# Patient Record
Sex: FEMALE | Race: WHITE | NOT HISPANIC OR LATINO | Employment: UNEMPLOYED | URBAN - NONMETROPOLITAN AREA
[De-identification: names, ages, dates, MRNs, and addresses within clinical notes are randomized per-mention and may not be internally consistent; named-entity substitution may affect disease eponyms.]

---

## 2020-08-26 ENCOUNTER — HOSPITAL ENCOUNTER (EMERGENCY)
Facility: HOSPITAL | Age: 35
Discharge: HOME | End: 2020-08-26
Attending: EMERGENCY MEDICINE | Admitting: EMERGENCY MEDICINE
Payer: COMMERCIAL

## 2020-08-26 ENCOUNTER — APPOINTMENT (EMERGENCY)
Dept: CT IMAGING | Facility: HOSPITAL | Age: 35
End: 2020-08-26
Payer: COMMERCIAL

## 2020-08-26 VITALS
HEART RATE: 76 BPM | DIASTOLIC BLOOD PRESSURE: 64 MMHG | OXYGEN SATURATION: 99 % | RESPIRATION RATE: 18 BRPM | TEMPERATURE: 97.2 F | WEIGHT: 233.8 LBS | SYSTOLIC BLOOD PRESSURE: 120 MMHG

## 2020-08-26 DIAGNOSIS — N13.30 HYDRONEPHROSIS, LEFT: Primary | ICD-10-CM

## 2020-08-26 DIAGNOSIS — N20.1 URETEROLITHIASIS: ICD-10-CM

## 2020-08-26 LAB
ALBUMIN SERPL BCP-MCNC: 3.7 G/DL (ref 3.5–5)
ALP SERPL-CCNC: 59 U/L (ref 46–116)
ALT SERPL W P-5'-P-CCNC: 60 U/L (ref 12–78)
ANION GAP SERPL CALCULATED.3IONS-SCNC: 8 MMOL/L (ref 4–13)
AST SERPL W P-5'-P-CCNC: 26 U/L (ref 5–45)
BACTERIA UR QL AUTO: ABNORMAL /HPF
BASOPHILS # BLD AUTO: 0.05 THOUSANDS/ΜL (ref 0–0.1)
BASOPHILS NFR BLD AUTO: 0 % (ref 0–1)
BILIRUB SERPL-MCNC: 0.35 MG/DL (ref 0.2–1)
BILIRUB UR QL STRIP: NEGATIVE
BUN SERPL-MCNC: 18 MG/DL (ref 5–25)
CALCIUM SERPL-MCNC: 8.7 MG/DL (ref 8.3–10.1)
CHLORIDE SERPL-SCNC: 104 MMOL/L (ref 100–108)
CLARITY UR: ABNORMAL
CO2 SERPL-SCNC: 26 MMOL/L (ref 21–32)
COLOR UR: ABNORMAL
CREAT SERPL-MCNC: 1.12 MG/DL (ref 0.6–1.3)
EOSINOPHIL # BLD AUTO: 0.07 THOUSAND/ΜL (ref 0–0.61)
EOSINOPHIL NFR BLD AUTO: 1 % (ref 0–6)
ERYTHROCYTE [DISTWIDTH] IN BLOOD BY AUTOMATED COUNT: 12.4 % (ref 11.6–15.1)
EXT PREG TEST URINE: NEGATIVE
EXT. CONTROL ED NAV: NORMAL
GFR SERPL CREATININE-BSD FRML MDRD: 64 ML/MIN/1.73SQ M
GLUCOSE SERPL-MCNC: 160 MG/DL (ref 65–140)
GLUCOSE UR STRIP-MCNC: NEGATIVE MG/DL
HCT VFR BLD AUTO: 43.2 % (ref 34.8–46.1)
HGB BLD-MCNC: 14.4 G/DL (ref 11.5–15.4)
HGB UR QL STRIP.AUTO: ABNORMAL
IMM GRANULOCYTES # BLD AUTO: 0.07 THOUSAND/UL (ref 0–0.2)
IMM GRANULOCYTES NFR BLD AUTO: 1 % (ref 0–2)
KETONES UR STRIP-MCNC: NEGATIVE MG/DL
LEUKOCYTE ESTERASE UR QL STRIP: ABNORMAL
LIPASE SERPL-CCNC: 150 U/L (ref 73–393)
LYMPHOCYTES # BLD AUTO: 1.44 THOUSANDS/ΜL (ref 0.6–4.47)
LYMPHOCYTES NFR BLD AUTO: 12 % (ref 14–44)
MCH RBC QN AUTO: 29.8 PG (ref 26.8–34.3)
MCHC RBC AUTO-ENTMCNC: 33.3 G/DL (ref 31.4–37.4)
MCV RBC AUTO: 89 FL (ref 82–98)
MONOCYTES # BLD AUTO: 0.62 THOUSAND/ΜL (ref 0.17–1.22)
MONOCYTES NFR BLD AUTO: 5 % (ref 4–12)
NEUTROPHILS # BLD AUTO: 10.12 THOUSANDS/ΜL (ref 1.85–7.62)
NEUTS SEG NFR BLD AUTO: 81 % (ref 43–75)
NITRITE UR QL STRIP: NEGATIVE
NON-SQ EPI CELLS URNS QL MICRO: ABNORMAL /HPF
NRBC BLD AUTO-RTO: 0 /100 WBCS
PH UR STRIP.AUTO: 7 [PH]
PLATELET # BLD AUTO: 276 THOUSANDS/UL (ref 149–390)
PMV BLD AUTO: 10.2 FL (ref 8.9–12.7)
POTASSIUM SERPL-SCNC: 4.3 MMOL/L (ref 3.5–5.3)
PROT SERPL-MCNC: 6.9 G/DL (ref 6.4–8.2)
PROT UR STRIP-MCNC: ABNORMAL MG/DL
RBC # BLD AUTO: 4.83 MILLION/UL (ref 3.81–5.12)
RBC #/AREA URNS AUTO: ABNORMAL /HPF
SODIUM SERPL-SCNC: 138 MMOL/L (ref 136–145)
SP GR UR STRIP.AUTO: 1.01 (ref 1–1.03)
UROBILINOGEN UR QL STRIP.AUTO: 0.2 E.U./DL
WBC # BLD AUTO: 12.37 THOUSAND/UL (ref 4.31–10.16)
WBC #/AREA URNS AUTO: ABNORMAL /HPF

## 2020-08-26 PROCEDURE — 36415 COLL VENOUS BLD VENIPUNCTURE: CPT | Performed by: EMERGENCY MEDICINE

## 2020-08-26 PROCEDURE — 83690 ASSAY OF LIPASE: CPT | Performed by: EMERGENCY MEDICINE

## 2020-08-26 PROCEDURE — G1004 CDSM NDSC: HCPCS

## 2020-08-26 PROCEDURE — 85025 COMPLETE CBC W/AUTO DIFF WBC: CPT | Performed by: EMERGENCY MEDICINE

## 2020-08-26 PROCEDURE — 96375 TX/PRO/DX INJ NEW DRUG ADDON: CPT

## 2020-08-26 PROCEDURE — 96361 HYDRATE IV INFUSION ADD-ON: CPT

## 2020-08-26 PROCEDURE — 80053 COMPREHEN METABOLIC PANEL: CPT | Performed by: EMERGENCY MEDICINE

## 2020-08-26 PROCEDURE — 96374 THER/PROPH/DIAG INJ IV PUSH: CPT

## 2020-08-26 PROCEDURE — 81001 URINALYSIS AUTO W/SCOPE: CPT | Performed by: EMERGENCY MEDICINE

## 2020-08-26 PROCEDURE — 81025 URINE PREGNANCY TEST: CPT | Performed by: EMERGENCY MEDICINE

## 2020-08-26 PROCEDURE — 74176 CT ABD & PELVIS W/O CONTRAST: CPT

## 2020-08-26 PROCEDURE — 99284 EMERGENCY DEPT VISIT MOD MDM: CPT | Performed by: EMERGENCY MEDICINE

## 2020-08-26 PROCEDURE — 99284 EMERGENCY DEPT VISIT MOD MDM: CPT

## 2020-08-26 RX ORDER — TAMSULOSIN HYDROCHLORIDE 0.4 MG/1
0.4 CAPSULE ORAL
Qty: 5 CAPSULE | Refills: 0 | Status: SHIPPED | OUTPATIENT
Start: 2020-08-26 | End: 2020-09-29

## 2020-08-26 RX ORDER — MELATONIN 10 MG
10 CAPSULE ORAL
COMMUNITY

## 2020-08-26 RX ORDER — ONDANSETRON 2 MG/ML
4 INJECTION INTRAMUSCULAR; INTRAVENOUS ONCE
Status: COMPLETED | OUTPATIENT
Start: 2020-08-26 | End: 2020-08-26

## 2020-08-26 RX ORDER — KETOROLAC TROMETHAMINE 30 MG/ML
15 INJECTION, SOLUTION INTRAMUSCULAR; INTRAVENOUS ONCE
Status: COMPLETED | OUTPATIENT
Start: 2020-08-26 | End: 2020-08-26

## 2020-08-26 RX ORDER — TAMSULOSIN HYDROCHLORIDE 0.4 MG/1
0.4 CAPSULE ORAL ONCE
Status: COMPLETED | OUTPATIENT
Start: 2020-08-26 | End: 2020-08-26

## 2020-08-26 RX ORDER — OXYCODONE HYDROCHLORIDE AND ACETAMINOPHEN 5; 325 MG/1; MG/1
1 TABLET ORAL EVERY 4 HOURS PRN
Qty: 15 TABLET | Refills: 0 | Status: ON HOLD | OUTPATIENT
Start: 2020-08-26 | End: 2020-09-30 | Stop reason: SDUPTHER

## 2020-08-26 RX ORDER — ONDANSETRON 4 MG/1
4 TABLET, FILM COATED ORAL EVERY 6 HOURS
Qty: 12 TABLET | Refills: 0 | Status: SHIPPED | OUTPATIENT
Start: 2020-08-26 | End: 2021-05-03

## 2020-08-26 RX ORDER — CEPHALEXIN 250 MG/1
500 CAPSULE ORAL ONCE
Status: COMPLETED | OUTPATIENT
Start: 2020-08-26 | End: 2020-08-26

## 2020-08-26 RX ORDER — CEPHALEXIN 500 MG/1
500 CAPSULE ORAL 4 TIMES DAILY
Qty: 20 CAPSULE | Refills: 0 | Status: SHIPPED | OUTPATIENT
Start: 2020-08-26 | End: 2020-08-31

## 2020-08-26 RX ADMIN — TAMSULOSIN HYDROCHLORIDE 0.4 MG: 0.4 CAPSULE ORAL at 08:15

## 2020-08-26 RX ADMIN — SODIUM CHLORIDE 1000 ML: 0.9 INJECTION, SOLUTION INTRAVENOUS at 06:29

## 2020-08-26 RX ADMIN — CEPHALEXIN 500 MG: 250 CAPSULE ORAL at 08:15

## 2020-08-26 RX ADMIN — KETOROLAC TROMETHAMINE 15 MG: 30 INJECTION, SOLUTION INTRAMUSCULAR at 06:31

## 2020-08-26 RX ADMIN — ONDANSETRON 4 MG: 2 INJECTION INTRAMUSCULAR; INTRAVENOUS at 06:32

## 2020-08-26 NOTE — ED PROVIDER NOTES
History  Chief Complaint   Patient presents with    Flank Pain     Patient started with left lower flank pain that radiates around left side  Patient has some nausea and vomiting  Patient is a 26-year-old female presenting to the emergency department complaining of left flank pain that has kept her awake since 2:00 a m  In the morning, she has had associated nausea and vomiting, denies any fever, no diarrhea, no dysuria or hematuria, no history of similar symptoms previously          Prior to Admission Medications   Prescriptions Last Dose Informant Patient Reported? Taking? Melatonin 10 MG CAPS   Yes Yes   Sig: Take 10 mg by mouth      Facility-Administered Medications: None       Past Medical History:   Diagnosis Date    Anal fistula        Past Surgical History:   Procedure Laterality Date    TREATMENT FISTULA ANAL         History reviewed  No pertinent family history  I have reviewed and agree with the history as documented  E-Cigarette/Vaping    E-Cigarette Use Never User      E-Cigarette/Vaping Substances     Social History     Tobacco Use    Smoking status: Never Smoker    Smokeless tobacco: Never Used   Substance Use Topics    Alcohol use: Not Currently    Drug use: Never       Review of Systems   Constitutional: Negative  HENT: Negative  Eyes: Negative  Respiratory: Negative  Cardiovascular: Negative  Gastrointestinal: Positive for nausea and vomiting  Endocrine: Negative  Genitourinary: Positive for flank pain  Skin: Negative  Allergic/Immunologic: Negative  Neurological: Negative  Hematological: Negative  Psychiatric/Behavioral: Negative  Physical Exam  Physical Exam  Constitutional:       Appearance: She is well-developed  HENT:      Head: Normocephalic and atraumatic  Eyes:      Conjunctiva/sclera: Conjunctivae normal       Pupils: Pupils are equal, round, and reactive to light     Neck:      Musculoskeletal: Normal range of motion and neck supple  Cardiovascular:      Rate and Rhythm: Normal rate  Pulmonary:      Effort: Pulmonary effort is normal    Abdominal:      Palpations: Abdomen is soft  Musculoskeletal: Normal range of motion  Skin:     General: Skin is warm and dry  Neurological:      Mental Status: She is alert and oriented to person, place, and time           Vital Signs  ED Triage Vitals [08/26/20 0622]   Temperature Pulse Respirations Blood Pressure SpO2   (!) 97 2 °F (36 2 °C) 92 18 123/59 99 %      Temp Source Heart Rate Source Patient Position - Orthostatic VS BP Location FiO2 (%)   Temporal Monitor Sitting Right arm --      Pain Score       7           Vitals:    08/26/20 0622 08/26/20 0824   BP: 123/59 120/64   Pulse: 92 76   Patient Position - Orthostatic VS: Sitting                ED Medications  Medications   sodium chloride 0 9 % bolus 1,000 mL (0 mL Intravenous Stopped 8/26/20 0729)   ketorolac (TORADOL) injection 15 mg (15 mg Intravenous Given 8/26/20 0631)   ondansetron (ZOFRAN) injection 4 mg (4 mg Intravenous Given 8/26/20 1639)   tamsulosin (FLOMAX) capsule 0 4 mg (0 4 mg Oral Given 8/26/20 0815)   cephalexin (KEFLEX) capsule 500 mg (500 mg Oral Given 8/26/20 0815)       Diagnostic Studies  Results Reviewed     Procedure Component Value Units Date/Time    Urine Microscopic [[id number]]  (Abnormal) Collected:  08/26/20 0702    Lab Status:  Final result Specimen:  Urine, Clean Catch Updated:  08/26/20 0735     RBC, UA Innumerable /hpf      WBC, UA 2-4 /hpf      Epithelial Cells Moderate /hpf      Bacteria, UA Occasional /hpf     UA w Reflex to Microscopic w Reflex to Culture [[id number]]  (Abnormal) Collected:  08/26/20 0702    Lab Status:  Final result Specimen:  Urine, Clean Catch Updated:  08/26/20 0727     Color, UA Danielle     Clarity, UA Slightly Cloudy     Specific [place], UA 1 010     pH, UA 7 0     Leukocytes, UA Small     Nitrite, UA Negative     Protein, UA Trace mg/dl      Glucose, UA Negative mg/dl Ketones, UA Negative mg/dl      Urobilinogen, UA 0 2 E U /dl      Bilirubin, UA Negative     Blood, UA Large    POCT pregnancy, urine [[id number]]  (Normal) Resulted:  08/26/20 0709    Lab Status:  Final result Updated:  08/26/20 0709     EXT PREG TEST UR (Ref: Negative) negative     Control valid    Comprehensive metabolic panel [[id number]]  (Abnormal) Collected:  08/26/20 0628    Lab Status:  Final result Specimen:  Blood from Arm, Right Updated:  08/26/20 0657     Sodium 138 mmol/L      Potassium 4 3 mmol/L      Chloride 104 mmol/L      CO2 26 mmol/L      ANION GAP 8 mmol/L      BUN 18 mg/dL      Creatinine 1 12 mg/dL      Glucose 160 mg/dL      Calcium 8 7 mg/dL      AST 26 U/L      ALT 60 U/L      Alkaline Phosphatase 59 U/L      Total Protein 6 9 g/dL      Albumin 3 7 g/dL      Total Bilirubin 0 35 mg/dL      eGFR 64 ml/min/1 73sq m     Narrative:       [place] guidelines for Chronic Kidney Disease (CKD):     Stage 1 with normal or high GFR (GFR > 90 mL/min/1 73 square meters)    Stage 2 Mild CKD (GFR = 60-89 mL/min/1 73 square meters)    Stage 3A Moderate CKD (GFR = 45-59 mL/min/1 73 square meters)    Stage 3B Moderate CKD (GFR = 30-44 mL/min/1 73 square meters)    Stage 4 Severe CKD (GFR = 15-29 mL/min/1 73 square meters)    Stage 5 End Stage CKD (GFR <15 mL/min/1 73 square meters)  Note: GFR calculation is accurate only with a steady state creatinine    Lipase [[id number]]  (Normal) Collected:  08/26/20 0628    Lab Status:  Final result Specimen:  Blood from Arm, Right Updated:  08/26/20 0657     Lipase 150 u/L     CBC and differential [[id number]]  (Abnormal) Collected:  08/26/20 0628    Lab Status:  Final result Specimen:  Blood from Arm, Right Updated:  08/26/20 0635     WBC 12 37 Thousand/uL      RBC 4 83 Million/uL      Hemoglobin 14 4 g/dL      Hematocrit 43 2 %      MCV 89 fL      MCH 29 8 pg      MCHC 33 3 g/dL      RDW 12 4 %      MPV 10 2 fL      Platelets 534 Thousands/uL      nRBC 0 /100 WBCs      Neutrophils Relative 81 %      Immat GRANS % 1 %      Lymphocytes Relative 12 %      Monocytes Relative 5 %      Eosinophils Relative 1 %      Basophils Relative 0 %      Neutrophils Absolute 10 12 Thousands/µL      Immature Grans Absolute 0 07 Thousand/uL      Lymphocytes Absolute 1 44 Thousands/µL      Monocytes Absolute 0 62 Thousand/µL      Eosinophils Absolute 0 07 Thousand/µL      Basophils Absolute 0 05 Thousands/µL                  CT renal stone study abdomen pelvis without contrast   Final Result by Hung Frederick MD (08/26 0747)      There is moderate left hydronephrosis and moderate left hydroureter related to an 8 x 7 x 6 mm calculus in the mid to distal left ureter, at the level of L5  Bilateral nephrolithiasis  No right hydronephrosis  Small right renal cyst       Borderline splenomegaly  Small hiatal hernia  Workstation performed: [id number]                         ED Course       US AUDIT      Most Recent Value   Initial Alcohol Screen: US AUDIT-C    1  How often do you have a drink containing alcohol?  0 Filed at: 08/26/2020 0624   2  How many drinks containing alcohol do you have on a typical day you are drinking? 0 Filed at: 08/26/2020 0624   3b  FEMALE Any Age, or MALE 65+: How often do you have 4 or more drinks on one occassion? 0 Filed at: 08/26/2020 3740   Audit-C Score  0 Filed at: 08/26/2020 2874                  RAPHAEL/DAST-10      Most Recent Value   How many times in the past year have you    Used an illegal drug or used a prescription medication for non-medical reasons?   Never Filed at: 08/26/2020 9295                                    Disposition  Final diagnoses:   Hydronephrosis, left   Ureterolithiasis - Left 6 x 7 x 8 mm mid to distal ureteral calculus     Time reflects when diagnosis was documented in both MDM as applicable and the Disposition within this note     Time User Action Codes Description Comment 8/26/2020  7:53 AM Landry Perez Add [N13 30] Hydronephrosis, left     8/26/2020  7:53 AM Ova Ezra Add [N20 1] Ureterolithiasis     8/26/2020  7:53 AM Treasure Landry Akers Modify [N20 1] Ureterolithiasis Left 6 x 7 x 8 mm mid ureteral calculus    8/26/2020  7:54 AM Yang Perez Modify [N20 1] Ureterolithiasis Left 6 x 7 x 8 mm mid to distal ureteral calculus      ED Disposition     ED Disposition Condition Date/Time Comment    Discharge Stable Wed Aug 26, 2020  7:52 AM Maeveangelina Trini discharge to home/self care  Follow-up Information     Follow up With Specialties Details Why Rufus Pandey MD Urology Schedule an appointment as soon as possible for a visit in 2 days 6 x 8 x 7 mm kidney stone with hydronephrosis left   [address], [place] [id number] [phone]            Discharge Medication List as of 8/26/2020  7:56 AM      START taking these medications    Details   cephalexin (KEFLEX) 500 mg capsule Take 1 capsule (500 mg total) by mouth 4 (four) times a day for 5 days, Starting Wed 8/26/2020, Until Mon 8/31/2020, Normal      ondansetron (ZOFRAN) 4 mg tablet Take 1 tablet (4 mg total) by mouth every 6 (six) hours, Starting Wed 8/26/2020, Normal      oxyCODONE-acetaminophen (PERCOCET) 5-325 mg per tablet Take 1 tablet by mouth every 4 (four) hours as needed for moderate pain for up to 15 dosesMax Daily Amount: 6 tablets, Starting Wed 8/26/2020, Normal      tamsulosin (FLOMAX) 0 4 mg Take 1 capsule (0 4 mg total) by mouth daily with dinner for 5 days, Starting Wed 8/26/2020, Until Mon 8/31/2020, Normal         CONTINUE these medications which have NOT CHANGED    Details   Melatonin 10 MG CAPS Take 10 mg by mouth, Historical Med               PDMP Review     None          ED Provider  Electronically Signed by           Jose Montilla DO  08/27/20 8361

## 2020-08-26 NOTE — ED CARE HANDOFF
Emergency Department Sign Out Note        Sign out and transfer of care from Dr Quispe   See Separate Emergency Department note  The patient, Nadja Rodriguez, was evaluated by the previous provider for flank pain  Workup Completed:  Labs Reviewed   CBC AND DIFFERENTIAL - Abnormal       Result Value Ref Range Status    WBC 12 37 (*) 4 31 - 10 16 Thousand/uL Final    RBC 4 83  3 81 - 5 12 Million/uL Final    Hemoglobin 14 4  11 5 - 15 4 g/dL Final    Hematocrit 43 2  34 8 - 46 1 % Final    MCV 89  82 - 98 fL Final    MCH 29 8  26 8 - 34 3 pg Final    MCHC 33 3  31 4 - 37 4 g/dL Final    RDW 12 4  11 6 - 15 1 % Final    MPV 10 2  8 9 - 12 7 fL Final    Platelets 638  112 - 390 Thousands/uL Final    nRBC 0  /100 WBCs Final    Neutrophils Relative 81 (*) 43 - 75 % Final    Immat GRANS % 1  0 - 2 % Final    Lymphocytes Relative 12 (*) 14 - 44 % Final    Monocytes Relative 5  4 - 12 % Final    Eosinophils Relative 1  0 - 6 % Final    Basophils Relative 0  0 - 1 % Final    Neutrophils Absolute 10 12 (*) 1 85 - 7 62 Thousands/µL Final    Immature Grans Absolute 0 07  0 00 - 0 20 Thousand/uL Final    Lymphocytes Absolute 1 44  0 60 - 4 47 Thousands/µL Final    Monocytes Absolute 0 62  0 17 - 1 22 Thousand/µL Final    Eosinophils Absolute 0 07  0 00 - 0 61 Thousand/µL Final    Basophils Absolute 0 05  0 00 - 0 10 Thousands/µL Final   COMPREHENSIVE METABOLIC PANEL - Abnormal    Sodium 138  136 - 145 mmol/L Final    Potassium 4 3  3 5 - 5 3 mmol/L Final    Chloride 104  100 - 108 mmol/L Final    CO2 26  21 - 32 mmol/L Final    ANION GAP 8  4 - 13 mmol/L Final    BUN 18  5 - 25 mg/dL Final    Creatinine 1 12  0 60 - 1 30 mg/dL Final    Comment: Standardized to IDMS reference method    Glucose 160 (*) 65 - 140 mg/dL Final    Comment: If the patient is fasting, the ADA then defines impaired fasting glucose as > 100 mg/dL and diabetes as > or equal to 123 mg/dL    Specimen collection should occur prior to Sulfasalazine administration due to the potential for falsely depressed results  Specimen collection should occur prior to Sulfapyridine administration due to the potential for falsely elevated results  Calcium 8 7  8 3 - 10 1 mg/dL Final    AST 26  5 - 45 U/L Final    Comment: Specimen collection should occur prior to Sulfasalazine administration due to the potential for falsely depressed results  ALT 60  12 - 78 U/L Final    Comment: Specimen collection should occur prior to Sulfasalazine administration due to the potential for falsely depressed results  Alkaline Phosphatase 59  46 - 116 U/L Final    Total Protein 6 9  6 4 - 8 2 g/dL Final    Albumin 3 7  3 5 - 5 0 g/dL Final    Total Bilirubin 0 35  0 20 - 1 00 mg/dL Final    Comment: Use of this assay is not recommended for patients undergoing treatment with eltrombopag due to the potential for falsely elevated results      eGFR 64  ml/min/1 73sq m Final    Narrative:     National Kid[employer] guidelines for Chronic Kidney Disease (CKD):     Stage 1 with normal or high GFR (GFR > 90 mL/min/1 73 square meters)    Stage 2 Mild CKD (GFR = 60-89 mL/min/1 73 square meters)    Stage 3A Moderate CKD (GFR = 45-59 mL/min/1 73 square meters)    Stage 3B Moderate CKD (GFR = 30-44 mL/min/1 73 square meters)    Stage 4 Severe CKD (GFR = 15-29 mL/min/1 73 square meters)    Stage 5 End Stage CKD (GFR <15 mL/min/1 73 square meters)  Note: GFR calculation is accurate only with a steady state creatinine   UA W REFLEX TO MICROSCOPIC WITH REFLEX TO CULTURE - Abnormal    Color, UA Danielle   Final    Clarity, UA Slightly Cloudy   Final    Specific [place], UA 1 010  1 003 - 1 030 Final    pH, UA 7 0  4 5, 5 0, 5 5, 6 0, 6 5, 7 0, 7 5, 8 0 Final    Leukocytes, UA Small (*) Negative Final    Nitrite, UA Negative  Negative Final    Protein, UA Trace (*) Negative mg/dl Final    Glucose, UA Negative  Negative mg/dl Final    Ketones, UA Negative  Negative mg/dl Final Urobilinogen, UA 0 2  0 2, 1 0 E U /dl E U /dl Final    Bilirubin, UA Negative  Negative Final    Blood, UA Large (*) Negative Final   URINE MICROSCOPIC - Abnormal    RBC, UA Innumerable (*) None Seen, 0-5 /hpf Final    WBC, UA 2-4 (*) None Seen, 0-5, 5-55, 5-65 /hpf Final    Epithelial Cells Moderate (*) None Seen, Occasional /hpf Final    Bacteria, UA Occasional  None Seen, Occasional /hpf Final   LIPASE - Normal    Lipase 150  73 - 393 u/L Final   POCT PREGNANCY, URINE - Normal    EXT PREG TEST UR (Ref: Negative) negative   Final    Control valid   Final         ED Course / Workup Pending (followup):  Ct Renal Stone Study Abdomen Pelvis Without Contrast    Result Date: 8/26/2020  Narrative: CT ABDOMEN AND PELVIS WITHOUT IV CONTRAST - LOW DOSE RENAL STONE INDICATION:   Flank pain, kidney stone suspected  Left flank pain, nausea, vomiting  COMPARISON:  None available  TECHNIQUE:  Low dose thin section CT examination of the abdomen and pelvis was performed without intravenous or oral contrast according to a protocol specifically designed to evaluate for urinary tract calculus  Axial, sagittal, and coronal 2D reformatted images were created from the source data and submitted for interpretation  Evaluation for pathology in the abdomen and pelvis that is unrelated to urinary tract calculi is limited  Radiation dose length product (DLP) for this visit:  561 mGy-cm   This examination, like all CT scans performed in the [place], was performed utilizing techniques to minimize radiation dose exposure, including the use of iterative reconstruction and automated exposure control  FINDINGS: RIGHT KIDNEY AND URETER: There are nonobstructing intrarenal calculi measuring on the order of 3 mm and less  No hydronephrosis or hydroureter  There is a 1 5 cm cyst within the lateral aspect of the interpolar region right kidney   LEFT KIDNEY AND URETER: There is an 8 x 7 x 6 mm calculus in the mid to distal left ureter at the level of L5  The left kidney is swollen and there is moderate left hydronephrosis and moderate left hydroureter to the level of the calculus  There is also some stranding of the fat tracking adjacent to the left ureter  Additionally, there is a 3 mm nonobstructing calculus in the lower pole left kidney  URINARY BLADDER: Evaluation of the urinary bladder is limited by underdistention  The configuration of the anterior superior aspect of the urinary bladder suggest urachal remnant  No significant abnormality in the visualized lung bases  Limited low radiation dose noncontrast CT evaluation demonstrates no clinically significant abnormality of the visualized liver, pancreas, or adrenal glands  The spleen is borderline enlarged, measuring approximately 13 8 cm AP dimension  No calcified gallstones or gallbladder wall thickening noted  No ascites or bulky lymphadenopathy on this limited noncontrast study  There is a small hiatal hernia  There is no evidence of bowel obstruction  Limited evaluation demonstrates no evidence to suggest acute appendicitis  No acute fracture or destructive osseous lesion is identified  An intrauterine contraceptive device is present within the uterus  Impression: There is moderate left hydronephrosis and moderate left hydroureter related to an 8 x 7 x 6 mm calculus in the mid to distal left ureter, at the level of L5  Bilateral nephrolithiasis  No right hydronephrosis  Small right renal cyst  Borderline splenomegaly  Small hiatal hernia  Workstation performed: [id number]                             ED Course as of Aug 26 0758   Wed Aug 26, 2020   0654 Case discussed and care assumed from Dr Marlen Nelson pending labs and CT for renal stone and further evaluation and treatment and disposition          2024 Patient seen and re-evaluated in ED pain is almost entirely relieved and subsided that this time she feels much improved advised of the finding of left ureterolithiasis and advised that this will not likely pass on its own and will require urologic care  Patient feels well and is stable no signs of urinary tract infection or infected ureteral calculus at this time she is a febrile nontoxic well-appearing  Recommended Keflex and Flomax for ureteral calculus Zofran and Percocet as needed for symptom control and severe pain advised to use over-the-counter analgesics for less severe pain and recommended prompt follow-up with urology for further evaluation and treatment patient understands instructions and will contact urologist promptly to obtain follow-up and further care return precautions and anticipatory guidance discussed  Procedures  MDM    Disposition  Final diagnoses:   Hydronephrosis, left   Ureterolithiasis - Left 6 x 7 x 8 mm mid to distal ureteral calculus     Time reflects when diagnosis was documented in both MDM as applicable and the Disposition within this note     Time User Action Codes Description Comment    8/26/2020  7:53 AM Rowan Infante Add [N13 30] Hydronephrosis, left     8/26/2020  7:53 AM Yang Abdi Add [N20 1] Ureterolithiasis     8/26/2020  7:53 AM Corina Abdi Modify [N20 1] Ureterolithiasis Left 6 x 7 x 8 mm mid ureteral calculus    8/26/2020  7:54 AM aYng Perez Modify [N20 1] Ureterolithiasis Left 6 x 7 x 8 mm mid to distal ureteral calculus      ED Disposition     ED Disposition Condition Date/Time Comment    Discharge Stable Wed Aug 26, 2020  7:52 AM Rex Shannon discharge to home/self care  Follow-up Information     Follow up With Specialties Details Why Hillary Iraheta MD Urology Schedule an appointment as soon as possible for a visit in 2 days 6 x 8 x 7 mm kidney stone with hydronephrosis left   [address]          Patient's Medications   Discharge Prescriptions    CEPHALEXIN (KEFLEX) 500 MG CAPSULE    Take 1 capsule (500 mg total) by mouth 4 (four) times a day for 5 days       Start Date: 8/26/2020 End Date: 8/31/2020       Order Dose: 500 mg       Quantity: 20 capsule    Refills: 0    ONDANSETRON (ZOFRAN) 4 MG TABLET    Take 1 tablet (4 mg total) by mouth every 6 (six) hours       Start Date: 8/26/2020 End Date: --       Order Dose: 4 mg       Quantity: 12 tablet    Refills: 0    OXYCODONE-ACETAMINOPHEN (PERCOCET) 5-325 MG PER TABLET    Take 1 tablet by mouth every 4 (four) hours as needed for moderate pain for up to 15 dosesMax Daily Amount: 6 tablets       Start Date: 8/26/2020 End Date: --       Order Dose: 1 tablet       Quantity: 15 tablet    Refills: 0    TAMSULOSIN (FLOMAX) 0 4 MG    Take 1 capsule (0 4 mg total) by mouth daily with dinner for 5 days       Start Date: 8/26/2020 End Date: 8/31/2020       Order Dose: 0 4 mg       Quantity: 5 capsule    Refills: 0            ED Provider  Electronically Signed by     Zohaib Musa DO  08/26/20 0967

## 2020-08-26 NOTE — Clinical Note
Niki Benoit was seen and treated in our emergency department on 8/26/2020  Off work today    Diagnosis:     Edie    She may return on this date: If you have any questions or concerns, please don't hesitate to call        Wil Martinez DO    ______________________________           _______________          _______________  Hospital Representative                              Date                                Time

## 2020-08-27 ENCOUNTER — TELEPHONE (OUTPATIENT)
Dept: UROLOGY | Facility: MEDICAL CENTER | Age: 35
End: 2020-08-27

## 2020-08-27 NOTE — TELEPHONE ENCOUNTER
Please Triage -   New Patient-     What is the reason for the patients appointment? Er follow up kidney stone - ref in EPIC       Do we accept the patient's insurance or is the patient Self-Pay? Provider: Otilia Duran MA [facility]   Plan: [id number]  Member ID#:   Group#:  Subscriber:   Phone#:  Location:      Has the patient had any previous urologist(s)? no       Have patient records been requested? no       Has the patient had any outside testing done? no       What is the patients location preference for an office visit? [facility] - Dr Kaur Lopez      Does the patient have a personal history of cancer? no      (If no cancer hx   ) Is the patient ok with seeing Advanced Practitioner? yes      Patient can be reached at : [phone]

## 2020-09-03 ENCOUNTER — OFFICE VISIT (OUTPATIENT)
Dept: UROLOGY | Facility: CLINIC | Age: 35
End: 2020-09-03
Payer: COMMERCIAL

## 2020-09-03 VITALS
HEART RATE: 71 BPM | WEIGHT: 231 LBS | SYSTOLIC BLOOD PRESSURE: 120 MMHG | TEMPERATURE: 97.5 F | HEIGHT: 64 IN | DIASTOLIC BLOOD PRESSURE: 70 MMHG | BODY MASS INDEX: 39.44 KG/M2

## 2020-09-03 DIAGNOSIS — N20.0 CALCULUS OF KIDNEY: Primary | ICD-10-CM

## 2020-09-03 DIAGNOSIS — N20.1 URETERAL CALCULUS, LEFT: ICD-10-CM

## 2020-09-03 PROCEDURE — 99204 OFFICE O/P NEW MOD 45 MIN: CPT | Performed by: UROLOGY

## 2020-09-03 RX ORDER — LORAZEPAM 0.5 MG/1
0.5 TABLET ORAL 2 TIMES DAILY PRN
COMMUNITY
Start: 2020-07-22

## 2020-09-03 RX ORDER — CEFAZOLIN SODIUM 2 G/50ML
2000 SOLUTION INTRAVENOUS ONCE
Status: CANCELLED | OUTPATIENT
Start: 2020-09-30 | End: 2020-09-03

## 2020-09-03 RX ORDER — TAMSULOSIN HYDROCHLORIDE 0.4 MG/1
0.4 CAPSULE ORAL EVERY EVENING
Qty: 30 CAPSULE | Refills: 0 | Status: SHIPPED | OUTPATIENT
Start: 2020-09-03 | End: 2021-05-03

## 2020-09-03 RX ORDER — CEPHALEXIN 500 MG/1
CAPSULE ORAL
COMMUNITY
Start: 2020-07-09 | End: 2020-09-29

## 2020-09-03 NOTE — H&P
[address] [facility]  [facility]  [address] [facility]  [place], [address] [place] [address]  [phone]  www  [facility]  org      NAME: Cezar Frederick  AGE: 38 y o  SEX: female  : 1985   MRN: [id number]    DATE: 9/3/2020  TIME: 2:57 PM    Assessment and Plan:    8 mm left lower ureteral calculus, with left hydronephrosis and left flank pain  Continue with Flomax and take pain medications as needed we will schedule her for cystoscopy, left ureteroscopy, laser lithotripsy and left ureteral stent placement  The risks of bleeding, infection, damage to the ureter and need for additional procedures have been explained and she gives informed consent  Chief Complaint   No chief complaint on file  History of Present Illness   New patient office visit:  39-year-old woman seen in the emergency department 2020 with an 8 mm stone in the mid to distal left ureter to the level of L5  There is left hydronephrosis  There is a 3 mm stone in the lower pole left kidney  On the right she had tiny stones only  Urinalysis showed innumerable red blood cells, 2-4 white blood cells and moderate epithelial cells occasional bacteria  We personally reviewed her images  The stone is basically around the pelvic brim  She is having episodes of pain requiring oxycodone occasionally  Of course this interferes with her working schedule  The following portions of the patient's history were reviewed and updated as appropriate: allergies, current medications, past family history, past medical history, past social history, past surgical history and problem list   Past Medical History:   Diagnosis Date    Anal fistula     Kidney stone     MRSA (methicillin resistant staph aureus) culture positive      Past Surgical History:   Procedure Laterality Date    TREATMENT FISTULA ANAL       shoulder  Review of Systems   Review of Systems   Constitutional: Negative for fever  Respiratory: Negative  Cardiovascular: Negative  Genitourinary: Positive for flank pain  Active Problem List   There is no problem list on file for this patient  Objective   /70   Pulse 71   Temp 97 5 °F (36 4 °C)   Ht 5' 4" (1 626 m)   Wt 105 kg (231 lb)   LMP  (LMP Unknown)   BMI 39 65 kg/m²     Physical Exam  Constitutional:       Appearance: Normal appearance  HENT:      Head: Normocephalic and atraumatic  Eyes:      Extraocular Movements: Extraocular movements intact  Neck:      Musculoskeletal: Normal range of motion  Pulmonary:      Effort: Pulmonary effort is normal    Musculoskeletal: Normal range of motion  Neurological:      General: No focal deficit present  Mental Status: She is alert and oriented to person, place, and time  Mental status is at baseline  Psychiatric:         Mood and Affect: Mood normal          Behavior: Behavior normal          Thought Content:  Thought content normal          Judgment: Judgment normal              Current Medications     Current Outpatient Medications:     LORazepam (ATIVAN) 0 5 mg tablet, , Disp: , Rfl:     Melatonin 10 MG CAPS, Take 10 mg by mouth, Disp: , Rfl:     ondansetron (ZOFRAN) 4 mg tablet, Take 1 tablet (4 mg total) by mouth every 6 (six) hours, Disp: 12 tablet, Rfl: 0    oxyCODONE-acetaminophen (PERCOCET) 5-325 mg per tablet, Take 1 tablet by mouth every 4 (four) hours as needed for moderate pain for up to 15 dosesMax Daily Amount: 6 tablets, Disp: 15 tablet, Rfl: 0    cephalexin (KEFLEX) 500 mg capsule, Take by mouth, Disp: , Rfl:     tamsulosin (FLOMAX) 0 4 mg, Take 1 capsule (0 4 mg total) by mouth daily with dinner for 5 days (Patient not taking: Reported on 9/3/2020), Disp: 5 capsule, Rfl: 0        Lexy Christie MD

## 2020-09-03 NOTE — PROGRESS NOTES
[address] [facility]  [facility]  [address] [facility]  [place], [address] [place] [address]  [phone]  www  [facility]  org      NAME: Rory Braxton  AGE: 38 y o  SEX: female  : 1985   MRN: [id number]    DATE: 9/3/2020  TIME: 2:57 PM    Assessment and Plan:    8 mm left lower ureteral calculus, with left hydronephrosis and left flank pain  Continue with Flomax and take pain medications as needed we will schedule her for cystoscopy, left ureteroscopy, laser lithotripsy and left ureteral stent placement  The risks of bleeding, infection, damage to the ureter and need for additional procedures have been explained and she gives informed consent  Chief Complaint   No chief complaint on file  History of Present Illness   New patient office visit:  50-year-old woman seen in the emergency department 2020 with an 8 mm stone in the mid to distal left ureter to the level of L5  There is left hydronephrosis  There is a 3 mm stone in the lower pole left kidney  On the right she had tiny stones only  Urinalysis showed innumerable red blood cells, 2-4 white blood cells and moderate epithelial cells occasional bacteria  We personally reviewed her images  The stone is basically around the pelvic brim  She is having episodes of pain requiring oxycodone occasionally  Of course this interferes with her working schedule  The following portions of the patient's history were reviewed and updated as appropriate: allergies, current medications, past family history, past medical history, past social history, past surgical history and problem list   Past Medical History:   Diagnosis Date    Anal fistula     Kidney stone     MRSA (methicillin resistant staph aureus) culture positive      Past Surgical History:   Procedure Laterality Date    TREATMENT FISTULA ANAL       shoulder  Review of Systems   Review of Systems   Constitutional: Negative for fever  Respiratory: Negative  Cardiovascular: Negative  Genitourinary: Positive for flank pain  Active Problem List   There is no problem list on file for this patient  Objective   /70   Pulse 71   Temp 97 5 °F (36 4 °C)   Ht 5' 4" (1 626 m)   Wt 105 kg (231 lb)   LMP  (LMP Unknown)   BMI 39 65 kg/m²     Physical Exam  Constitutional:       Appearance: Normal appearance  HENT:      Head: Normocephalic and atraumatic  Eyes:      Extraocular Movements: Extraocular movements intact  Neck:      Musculoskeletal: Normal range of motion  Pulmonary:      Effort: Pulmonary effort is normal    Musculoskeletal: Normal range of motion  Neurological:      General: No focal deficit present  Mental Status: She is alert and oriented to person, place, and time  Mental status is at baseline  Psychiatric:         Mood and Affect: Mood normal          Behavior: Behavior normal          Thought Content:  Thought content normal          Judgment: Judgment normal              Current Medications     Current Outpatient Medications:     LORazepam (ATIVAN) 0 5 mg tablet, , Disp: , Rfl:     Melatonin 10 MG CAPS, Take 10 mg by mouth, Disp: , Rfl:     ondansetron (ZOFRAN) 4 mg tablet, Take 1 tablet (4 mg total) by mouth every 6 (six) hours, Disp: 12 tablet, Rfl: 0    oxyCODONE-acetaminophen (PERCOCET) 5-325 mg per tablet, Take 1 tablet by mouth every 4 (four) hours as needed for moderate pain for up to 15 dosesMax Daily Amount: 6 tablets, Disp: 15 tablet, Rfl: 0    cephalexin (KEFLEX) 500 mg capsule, Take by mouth, Disp: , Rfl:     tamsulosin (FLOMAX) 0 4 mg, Take 1 capsule (0 4 mg total) by mouth daily with dinner for 5 days (Patient not taking: Reported on 9/3/2020), Disp: 5 capsule, Rfl: 0        Esme Pinto MD

## 2020-09-04 ENCOUNTER — TELEPHONE (OUTPATIENT)
Dept: UROLOGY | Facility: MEDICAL CENTER | Age: 35
End: 2020-09-04

## 2020-09-04 NOTE — TELEPHONE ENCOUNTER
Patient called back and I scheduled surgery for 9/30 with Dr Nicole Navarro at [facility]  Patient is aware of PATs  I am sending her a surgery packet in the mail

## 2020-09-24 ENCOUNTER — APPOINTMENT (OUTPATIENT)
Dept: LAB | Facility: HOSPITAL | Age: 35
End: 2020-09-24
Attending: UROLOGY
Payer: COMMERCIAL

## 2020-09-24 DIAGNOSIS — N20.0 CALCULUS OF KIDNEY: ICD-10-CM

## 2020-09-24 PROCEDURE — 87086 URINE CULTURE/COLONY COUNT: CPT

## 2020-09-26 LAB — BACTERIA UR CULT: NORMAL

## 2020-09-29 ENCOUNTER — ANESTHESIA EVENT (OUTPATIENT)
Dept: PERIOP | Facility: HOSPITAL | Age: 35
End: 2020-09-29
Payer: COMMERCIAL

## 2020-09-30 ENCOUNTER — ANESTHESIA (OUTPATIENT)
Dept: PERIOP | Facility: HOSPITAL | Age: 35
End: 2020-09-30
Payer: COMMERCIAL

## 2020-09-30 ENCOUNTER — HOSPITAL ENCOUNTER (OUTPATIENT)
Facility: HOSPITAL | Age: 35
Setting detail: OUTPATIENT SURGERY
Discharge: HOME | End: 2020-09-30
Attending: UROLOGY | Admitting: UROLOGY
Payer: COMMERCIAL

## 2020-09-30 ENCOUNTER — TELEPHONE (OUTPATIENT)
Dept: UROLOGY | Facility: MEDICAL CENTER | Age: 35
End: 2020-09-30

## 2020-09-30 ENCOUNTER — APPOINTMENT (OUTPATIENT)
Dept: RADIOLOGY | Facility: HOSPITAL | Age: 35
End: 2020-09-30
Payer: COMMERCIAL

## 2020-09-30 VITALS
DIASTOLIC BLOOD PRESSURE: 68 MMHG | OXYGEN SATURATION: 97 % | HEART RATE: 83 BPM | WEIGHT: 231 LBS | BODY MASS INDEX: 39.44 KG/M2 | TEMPERATURE: 98.4 F | HEIGHT: 64 IN | RESPIRATION RATE: 16 BRPM | SYSTOLIC BLOOD PRESSURE: 109 MMHG

## 2020-09-30 VITALS — HEART RATE: 94 BPM

## 2020-09-30 DIAGNOSIS — N20.1 URETEROLITHIASIS: ICD-10-CM

## 2020-09-30 DIAGNOSIS — N13.30 HYDRONEPHROSIS, LEFT: ICD-10-CM

## 2020-09-30 DIAGNOSIS — N20.1 URETERAL CALCULUS, LEFT: ICD-10-CM

## 2020-09-30 DIAGNOSIS — G89.18 POSTOPERATIVE PAIN: Primary | ICD-10-CM

## 2020-09-30 LAB
EXT PREGNANCY TEST URINE: NEGATIVE
EXT. CONTROL: NORMAL

## 2020-09-30 PROCEDURE — 81025 URINE PREGNANCY TEST: CPT | Performed by: UROLOGY

## 2020-09-30 PROCEDURE — 74018 RADEX ABDOMEN 1 VIEW: CPT

## 2020-09-30 PROCEDURE — 82360 CALCULUS ASSAY QUANT: CPT | Performed by: UROLOGY

## 2020-09-30 PROCEDURE — C2617 STENT, NON-COR, TEM W/O DEL: HCPCS | Performed by: UROLOGY

## 2020-09-30 PROCEDURE — C1769 GUIDE WIRE: HCPCS | Performed by: UROLOGY

## 2020-09-30 PROCEDURE — 52356 CYSTO/URETERO W/LITHOTRIPSY: CPT | Performed by: UROLOGY

## 2020-09-30 DEVICE — STENT URETERAL 6 FR 26CM INLAY OPTIMA: Type: IMPLANTABLE DEVICE | Site: URETER | Status: FUNCTIONAL

## 2020-09-30 RX ORDER — OXYBUTYNIN CHLORIDE 5 MG/1
5 TABLET ORAL 3 TIMES DAILY PRN
Qty: 20 TABLET | Refills: 0 | Status: SHIPPED | OUTPATIENT
Start: 2020-09-30 | End: 2021-05-03

## 2020-09-30 RX ORDER — HYDROCODONE BITARTRATE AND ACETAMINOPHEN 5; 325 MG/1; MG/1
1-2 TABLET ORAL EVERY 6 HOURS PRN
Qty: 20 TABLET | Refills: 0 | Status: SHIPPED | OUTPATIENT
Start: 2020-09-30 | End: 2020-10-10

## 2020-09-30 RX ORDER — FENTANYL CITRATE/PF 50 MCG/ML
25 SYRINGE (ML) INJECTION
Status: DISCONTINUED | OUTPATIENT
Start: 2020-09-30 | End: 2020-09-30 | Stop reason: HOSPADM

## 2020-09-30 RX ORDER — OXYBUTYNIN CHLORIDE 5 MG/1
5 TABLET, EXTENDED RELEASE ORAL DAILY
Status: DISCONTINUED | OUTPATIENT
Start: 2020-09-30 | End: 2020-09-30 | Stop reason: HOSPADM

## 2020-09-30 RX ORDER — SODIUM CHLORIDE, SODIUM LACTATE, POTASSIUM CHLORIDE, CALCIUM CHLORIDE 600; 310; 30; 20 MG/100ML; MG/100ML; MG/100ML; MG/100ML
125 INJECTION, SOLUTION INTRAVENOUS CONTINUOUS
Status: DISCONTINUED | OUTPATIENT
Start: 2020-09-30 | End: 2020-09-30 | Stop reason: HOSPADM

## 2020-09-30 RX ORDER — OXYCODONE HYDROCHLORIDE AND ACETAMINOPHEN 5; 325 MG/1; MG/1
1 TABLET ORAL EVERY 4 HOURS PRN
Status: DISCONTINUED | OUTPATIENT
Start: 2020-09-30 | End: 2020-09-30 | Stop reason: HOSPADM

## 2020-09-30 RX ORDER — FENTANYL CITRATE 50 UG/ML
INJECTION, SOLUTION INTRAMUSCULAR; INTRAVENOUS AS NEEDED
Status: DISCONTINUED | OUTPATIENT
Start: 2020-09-30 | End: 2020-09-30

## 2020-09-30 RX ORDER — OXYCODONE HYDROCHLORIDE AND ACETAMINOPHEN 5; 325 MG/1; MG/1
1 TABLET ORAL EVERY 6 HOURS PRN
Qty: 20 TABLET | Refills: 0 | Status: SHIPPED | OUTPATIENT
Start: 2020-09-30 | End: 2020-10-10

## 2020-09-30 RX ORDER — PHENAZOPYRIDINE HYDROCHLORIDE 200 MG/1
200 TABLET, FILM COATED ORAL 3 TIMES DAILY PRN
Qty: 10 TABLET | Refills: 0 | Status: SHIPPED | OUTPATIENT
Start: 2020-09-30 | End: 2021-05-03

## 2020-09-30 RX ORDER — MIDAZOLAM HYDROCHLORIDE 2 MG/2ML
INJECTION, SOLUTION INTRAMUSCULAR; INTRAVENOUS AS NEEDED
Status: DISCONTINUED | OUTPATIENT
Start: 2020-09-30 | End: 2020-09-30

## 2020-09-30 RX ORDER — DEXAMETHASONE SODIUM PHOSPHATE 4 MG/ML
INJECTION, SOLUTION INTRA-ARTICULAR; INTRALESIONAL; INTRAMUSCULAR; INTRAVENOUS; SOFT TISSUE AS NEEDED
Status: DISCONTINUED | OUTPATIENT
Start: 2020-09-30 | End: 2020-09-30

## 2020-09-30 RX ORDER — ONDANSETRON 2 MG/ML
INJECTION INTRAMUSCULAR; INTRAVENOUS AS NEEDED
Status: DISCONTINUED | OUTPATIENT
Start: 2020-09-30 | End: 2020-09-30

## 2020-09-30 RX ORDER — OXYCODONE HYDROCHLORIDE AND ACETAMINOPHEN 5; 325 MG/1; MG/1
1 TABLET ORAL EVERY 4 HOURS PRN
Qty: 20 TABLET | Refills: 0 | Status: SHIPPED | OUTPATIENT
Start: 2020-09-30 | End: 2020-10-10

## 2020-09-30 RX ORDER — OXYCODONE HYDROCHLORIDE AND ACETAMINOPHEN 5; 325 MG/1; MG/1
1 TABLET ORAL EVERY 4 HOURS PRN
Qty: 15 TABLET | Refills: 0 | Status: SHIPPED | OUTPATIENT
Start: 2020-09-30 | End: 2021-02-28 | Stop reason: HOSPADM

## 2020-09-30 RX ORDER — LIDOCAINE HYDROCHLORIDE 20 MG/ML
INJECTION, SOLUTION INFILTRATION; PERINEURAL AS NEEDED
Status: DISCONTINUED | OUTPATIENT
Start: 2020-09-30 | End: 2020-09-30

## 2020-09-30 RX ORDER — CEPHALEXIN 500 MG/1
500 CAPSULE ORAL EVERY 6 HOURS SCHEDULED
Qty: 12 CAPSULE | Refills: 0 | Status: SHIPPED | OUTPATIENT
Start: 2020-09-30 | End: 2020-10-03

## 2020-09-30 RX ORDER — PROPOFOL 10 MG/ML
INJECTION, EMULSION INTRAVENOUS AS NEEDED
Status: DISCONTINUED | OUTPATIENT
Start: 2020-09-30 | End: 2020-09-30

## 2020-09-30 RX ORDER — CEFAZOLIN SODIUM 2 G/50ML
2000 SOLUTION INTRAVENOUS ONCE
Status: DISCONTINUED | OUTPATIENT
Start: 2020-09-30 | End: 2020-09-30 | Stop reason: HOSPADM

## 2020-09-30 RX ORDER — CEFAZOLIN SODIUM 2 G/50ML
SOLUTION INTRAVENOUS AS NEEDED
Status: DISCONTINUED | OUTPATIENT
Start: 2020-09-30 | End: 2020-09-30

## 2020-09-30 RX ORDER — ONDANSETRON 2 MG/ML
4 INJECTION INTRAMUSCULAR; INTRAVENOUS ONCE AS NEEDED
Status: DISCONTINUED | OUTPATIENT
Start: 2020-09-30 | End: 2020-09-30 | Stop reason: HOSPADM

## 2020-09-30 RX ORDER — PHENAZOPYRIDINE HYDROCHLORIDE 200 MG/1
200 TABLET, FILM COATED ORAL ONCE
Status: COMPLETED | OUTPATIENT
Start: 2020-09-30 | End: 2020-09-30

## 2020-09-30 RX ADMIN — CEFAZOLIN SODIUM 2000 MG: 2 SOLUTION INTRAVENOUS at 14:10

## 2020-09-30 RX ADMIN — DEXAMETHASONE SODIUM PHOSPHATE 4 MG: 4 INJECTION, SOLUTION INTRAMUSCULAR; INTRAVENOUS at 14:43

## 2020-09-30 RX ADMIN — PHENAZOPYRIDINE 200 MG: 200 TABLET ORAL at 16:56

## 2020-09-30 RX ADMIN — FENTANYL CITRATE 25 MCG: 50 INJECTION, SOLUTION INTRAMUSCULAR; INTRAVENOUS at 14:38

## 2020-09-30 RX ADMIN — FENTANYL CITRATE 50 MCG: 50 INJECTION, SOLUTION INTRAMUSCULAR; INTRAVENOUS at 15:08

## 2020-09-30 RX ADMIN — LIDOCAINE HYDROCHLORIDE 5 ML: 20 INJECTION, SOLUTION INFILTRATION; PERINEURAL at 14:32

## 2020-09-30 RX ADMIN — FENTANYL CITRATE 50 MCG: 50 INJECTION, SOLUTION INTRAMUSCULAR; INTRAVENOUS at 14:24

## 2020-09-30 RX ADMIN — OXYBUTYNIN CHLORIDE 5 MG: 5 TABLET, EXTENDED RELEASE ORAL at 16:56

## 2020-09-30 RX ADMIN — OXYCODONE HYDROCHLORIDE AND ACETAMINOPHEN 1 TABLET: 5; 325 TABLET ORAL at 17:17

## 2020-09-30 RX ADMIN — FENTANYL CITRATE 25 MCG: 50 INJECTION, SOLUTION INTRAMUSCULAR; INTRAVENOUS at 14:43

## 2020-09-30 RX ADMIN — MIDAZOLAM 2 MG: 1 INJECTION INTRAMUSCULAR; INTRAVENOUS at 14:24

## 2020-09-30 RX ADMIN — ONDANSETRON 4 MG: 2 INJECTION INTRAMUSCULAR; INTRAVENOUS at 14:47

## 2020-09-30 RX ADMIN — PROPOFOL 200 MG: 10 INJECTION, EMULSION INTRAVENOUS at 14:32

## 2020-09-30 RX ADMIN — SODIUM CHLORIDE, SODIUM LACTATE, POTASSIUM CHLORIDE, AND CALCIUM CHLORIDE 125 ML/HR: .6; .31; .03; .02 INJECTION, SOLUTION INTRAVENOUS at 10:47

## 2020-09-30 NOTE — TELEPHONE ENCOUNTER
----- Message from Wyatt Rachel MD sent at 9/30/2020  3:21 PM EDT -----  Please call pt with appt for nurse to remove stent with string next week, and see me in [place] in 6 months with renal US

## 2020-09-30 NOTE — ANESTHESIA PREPROCEDURE EVALUATION
Procedure:  CYSTO, URETEROSCOPY W/HOLMIUM LASER, RETROGRADE PYELOGRAM, STENT (Left Ureter)    Relevant Problems   GI/HEPATIC   (+) GERD (gastroesophageal reflux disease)      NEURO/PSYCH   (+) Anxiety   (+) Depression      Other   (+) Left ureteral calculus   (+) MRSA (methicillin resistant staph aureus) culture positive   (+) Vertigo        Physical Exam    Airway    Mallampati score: III  TM Distance: >3 FB  Neck ROM: full     Dental   No notable dental hx     Cardiovascular  Rhythm: regular, Rate: normal, Cardiovascular exam normal    Pulmonary  Pulmonary exam normal Breath sounds clear to auscultation,     Other Findings        Anesthesia Plan  ASA Score- 3     Anesthesia Type- general with ASA Monitors  Additional Monitors:   Airway Plan:     Comment: WBC elevated, w/ neutophils increased          Plan Factors-    Chart reviewed  Existing labs reviewed  Patient summary reviewed  Patient is not a current smoker  Patient instructed to abstain from smoking on day of procedure  Patient did not smoke on day of surgery  Induction- intravenous  Postoperative Plan- Plan for postoperative opioid use  Planned trial extubation    Informed Consent- Anesthetic plan and risks discussed with patient

## 2020-10-01 NOTE — TELEPHONE ENCOUNTER
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

## 2020-10-01 NOTE — TELEPHONE ENCOUNTER
Patient is discharged and is calling to say she would like to go back to work soon    Needs to set up stent removal

## 2020-10-01 NOTE — TELEPHONE ENCOUNTER
Patient calling for a work note  Patient would like to know how long can she request off  Patient did not go to work today and is thinking about taking FMLA  Please advise

## 2020-10-01 NOTE — TELEPHONE ENCOUNTER
Called and spoke with patient  Patient scheduled 10/8/20 at 3:00pm for stent with string removal in the [place] office

## 2020-10-02 ENCOUNTER — TELEPHONE (OUTPATIENT)
Dept: UROLOGY | Facility: MEDICAL CENTER | Age: 35
End: 2020-10-02

## 2020-10-08 ENCOUNTER — TELEPHONE (OUTPATIENT)
Dept: UROLOGY | Facility: CLINIC | Age: 35
End: 2020-10-08

## 2020-10-10 LAB
CALCIUM OXALATE DIHYDRATE MFR STONE IR: 30 %
COLOR STONE: NORMAL
COM MFR STONE: 50 %
COMMENT-STONE3: NORMAL
COMPOSITION: NORMAL
HYDROXYAPATITE 24H ENGDIFF UR: 20 %
LABORATORY COMMENT REPORT: NORMAL
PHOTO: NORMAL
SIZE STONE: NORMAL MM
SPEC SOURCE SUBJ: NORMAL
STONE ANALYSIS-IMP: NORMAL
WT STONE: 14 MG

## 2021-02-26 ENCOUNTER — HOSPITAL ENCOUNTER (INPATIENT)
Facility: HOSPITAL | Age: 36
LOS: 2 days | Discharge: HOME HEALTH | DRG: 493 | End: 2021-02-28
Attending: ORTHOPAEDIC SURGERY | Admitting: ORTHOPAEDIC SURGERY
Payer: COMMERCIAL

## 2021-02-26 ENCOUNTER — APPOINTMENT (INPATIENT)
Dept: RADIOLOGY | Facility: HOSPITAL | Age: 36
DRG: 493 | End: 2021-02-26
Payer: COMMERCIAL

## 2021-02-26 ENCOUNTER — APPOINTMENT (EMERGENCY)
Dept: RADIOLOGY | Facility: HOSPITAL | Age: 36
DRG: 563 | End: 2021-02-26
Payer: COMMERCIAL

## 2021-02-26 ENCOUNTER — APPOINTMENT (INPATIENT)
Dept: CT IMAGING | Facility: HOSPITAL | Age: 36
DRG: 563 | End: 2021-02-26
Payer: COMMERCIAL

## 2021-02-26 ENCOUNTER — ANESTHESIA EVENT (INPATIENT)
Dept: PERIOP | Facility: HOSPITAL | Age: 36
DRG: 493 | End: 2021-02-26
Payer: COMMERCIAL

## 2021-02-26 ENCOUNTER — HOSPITAL ENCOUNTER (INPATIENT)
Facility: HOSPITAL | Age: 36
LOS: 1 days | DRG: 563 | End: 2021-02-26
Attending: STUDENT IN AN ORGANIZED HEALTH CARE EDUCATION/TRAINING PROGRAM | Admitting: FAMILY MEDICINE
Payer: COMMERCIAL

## 2021-02-26 VITALS
DIASTOLIC BLOOD PRESSURE: 77 MMHG | OXYGEN SATURATION: 96 % | WEIGHT: 243.17 LBS | BODY MASS INDEX: 41.51 KG/M2 | HEART RATE: 84 BPM | RESPIRATION RATE: 17 BRPM | TEMPERATURE: 97.8 F | SYSTOLIC BLOOD PRESSURE: 134 MMHG | HEIGHT: 64 IN

## 2021-02-26 DIAGNOSIS — S82.851A CLOSED TRIMALLEOLAR FRACTURE OF RIGHT ANKLE, INITIAL ENCOUNTER: Primary | ICD-10-CM

## 2021-02-26 DIAGNOSIS — S82.891A CLOSED FRACTURE OF RIGHT ANKLE, INITIAL ENCOUNTER: Primary | ICD-10-CM

## 2021-02-26 PROBLEM — R73.03 PREDIABETES: Status: ACTIVE | Noted: 2021-02-26

## 2021-02-26 LAB
ABO GROUP BLD: NORMAL
ALBUMIN SERPL BCP-MCNC: 3.1 G/DL (ref 3.5–5)
ALP SERPL-CCNC: 65 U/L (ref 46–116)
ALT SERPL W P-5'-P-CCNC: 50 U/L (ref 12–78)
ANION GAP SERPL CALCULATED.3IONS-SCNC: 7 MMOL/L (ref 4–13)
APTT PPP: 25 SECONDS (ref 23–37)
AST SERPL W P-5'-P-CCNC: 21 U/L (ref 5–45)
BASOPHILS # BLD AUTO: 0.05 THOUSANDS/ΜL (ref 0–0.1)
BASOPHILS NFR BLD AUTO: 1 % (ref 0–1)
BILIRUB SERPL-MCNC: 0.69 MG/DL (ref 0.2–1)
BUN SERPL-MCNC: 12 MG/DL (ref 5–25)
CALCIUM ALBUM COR SERPL-MCNC: 8.9 MG/DL (ref 8.3–10.1)
CALCIUM SERPL-MCNC: 8.2 MG/DL (ref 8.3–10.1)
CHLORIDE SERPL-SCNC: 105 MMOL/L (ref 100–108)
CO2 SERPL-SCNC: 26 MMOL/L (ref 21–32)
CREAT SERPL-MCNC: 0.88 MG/DL (ref 0.6–1.3)
EOSINOPHIL # BLD AUTO: 0.04 THOUSAND/ΜL (ref 0–0.61)
EOSINOPHIL NFR BLD AUTO: 0 % (ref 0–6)
ERYTHROCYTE [DISTWIDTH] IN BLOOD BY AUTOMATED COUNT: 12.2 % (ref 11.6–15.1)
GFR SERPL CREATININE-BSD FRML MDRD: 85 ML/MIN/1.73SQ M
GLUCOSE SERPL-MCNC: 131 MG/DL (ref 65–140)
HCT VFR BLD AUTO: 42.7 % (ref 34.8–46.1)
HGB BLD-MCNC: 14.1 G/DL (ref 11.5–15.4)
IMM GRANULOCYTES # BLD AUTO: 0.04 THOUSAND/UL (ref 0–0.2)
IMM GRANULOCYTES NFR BLD AUTO: 0 % (ref 0–2)
INR PPP: 1.04 (ref 0.84–1.19)
LYMPHOCYTES # BLD AUTO: 1.83 THOUSANDS/ΜL (ref 0.6–4.47)
LYMPHOCYTES NFR BLD AUTO: 17 % (ref 14–44)
MAGNESIUM SERPL-MCNC: 2.2 MG/DL (ref 1.6–2.6)
MCH RBC QN AUTO: 29.5 PG (ref 26.8–34.3)
MCHC RBC AUTO-ENTMCNC: 33 G/DL (ref 31.4–37.4)
MCV RBC AUTO: 89 FL (ref 82–98)
MONOCYTES # BLD AUTO: 0.62 THOUSAND/ΜL (ref 0.17–1.22)
MONOCYTES NFR BLD AUTO: 6 % (ref 4–12)
NEUTROPHILS # BLD AUTO: 8.26 THOUSANDS/ΜL (ref 1.85–7.62)
NEUTS SEG NFR BLD AUTO: 76 % (ref 43–75)
NRBC BLD AUTO-RTO: 0 /100 WBCS
PHOSPHATE SERPL-MCNC: 3.5 MG/DL (ref 2.7–4.5)
PLATELET # BLD AUTO: 248 THOUSANDS/UL (ref 149–390)
PMV BLD AUTO: 9.9 FL (ref 8.9–12.7)
POTASSIUM SERPL-SCNC: 4 MMOL/L (ref 3.5–5.3)
PROT SERPL-MCNC: 6.3 G/DL (ref 6.4–8.2)
PROTHROMBIN TIME: 13.4 SECONDS (ref 11.6–14.5)
RBC # BLD AUTO: 4.78 MILLION/UL (ref 3.81–5.12)
RH BLD: NEGATIVE
SODIUM SERPL-SCNC: 138 MMOL/L (ref 136–145)
WBC # BLD AUTO: 10.84 THOUSAND/UL (ref 4.31–10.16)

## 2021-02-26 PROCEDURE — 99285 EMERGENCY DEPT VISIT HI MDM: CPT

## 2021-02-26 PROCEDURE — 99285 EMERGENCY DEPT VISIT HI MDM: CPT | Performed by: STUDENT IN AN ORGANIZED HEALTH CARE EDUCATION/TRAINING PROGRAM

## 2021-02-26 PROCEDURE — 99152 MOD SED SAME PHYS/QHP 5/>YRS: CPT | Performed by: STUDENT IN AN ORGANIZED HEALTH CARE EDUCATION/TRAINING PROGRAM

## 2021-02-26 PROCEDURE — 97163 PT EVAL HIGH COMPLEX 45 MIN: CPT

## 2021-02-26 PROCEDURE — NC001 PR NO CHARGE: Performed by: FAMILY MEDICINE

## 2021-02-26 PROCEDURE — 84100 ASSAY OF PHOSPHORUS: CPT | Performed by: ORTHOPAEDIC SURGERY

## 2021-02-26 PROCEDURE — 97116 GAIT TRAINING THERAPY: CPT

## 2021-02-26 PROCEDURE — 27818 TREATMENT OF ANKLE FRACTURE: CPT | Performed by: STUDENT IN AN ORGANIZED HEALTH CARE EDUCATION/TRAINING PROGRAM

## 2021-02-26 PROCEDURE — 96374 THER/PROPH/DIAG INJ IV PUSH: CPT

## 2021-02-26 PROCEDURE — NC001 PR NO CHARGE: Performed by: ORTHOPAEDIC SURGERY

## 2021-02-26 PROCEDURE — 73610 X-RAY EXAM OF ANKLE: CPT

## 2021-02-26 PROCEDURE — 97167 OT EVAL HIGH COMPLEX 60 MIN: CPT

## 2021-02-26 PROCEDURE — 83735 ASSAY OF MAGNESIUM: CPT | Performed by: ORTHOPAEDIC SURGERY

## 2021-02-26 PROCEDURE — 0QSJ04Z REPOSITION RIGHT FIBULA WITH INTERNAL FIXATION DEVICE, OPEN APPROACH: ICD-10-PCS | Performed by: ORTHOPAEDIC SURGERY

## 2021-02-26 PROCEDURE — 85730 THROMBOPLASTIN TIME PARTIAL: CPT | Performed by: FAMILY MEDICINE

## 2021-02-26 PROCEDURE — 73700 CT LOWER EXTREMITY W/O DYE: CPT

## 2021-02-26 PROCEDURE — 99222 1ST HOSP IP/OBS MODERATE 55: CPT | Performed by: FAMILY MEDICINE

## 2021-02-26 PROCEDURE — 80053 COMPREHEN METABOLIC PANEL: CPT | Performed by: FAMILY MEDICINE

## 2021-02-26 PROCEDURE — 85025 COMPLETE CBC W/AUTO DIFF WBC: CPT | Performed by: FAMILY MEDICINE

## 2021-02-26 PROCEDURE — 71045 X-RAY EXAM CHEST 1 VIEW: CPT

## 2021-02-26 PROCEDURE — 73600 X-RAY EXAM OF ANKLE: CPT

## 2021-02-26 PROCEDURE — 0QSG04Z REPOSITION RIGHT TIBIA WITH INTERNAL FIXATION DEVICE, OPEN APPROACH: ICD-10-PCS | Performed by: ORTHOPAEDIC SURGERY

## 2021-02-26 PROCEDURE — 85610 PROTHROMBIN TIME: CPT | Performed by: FAMILY MEDICINE

## 2021-02-26 PROCEDURE — 99255 IP/OBS CONSLTJ NEW/EST HI 80: CPT | Performed by: ORTHOPAEDIC SURGERY

## 2021-02-26 RX ORDER — GABAPENTIN 300 MG/1
300 CAPSULE ORAL
Status: DISCONTINUED | OUTPATIENT
Start: 2021-02-26 | End: 2021-02-28 | Stop reason: HOSPADM

## 2021-02-26 RX ORDER — SODIUM CHLORIDE, SODIUM LACTATE, POTASSIUM CHLORIDE, CALCIUM CHLORIDE 600; 310; 30; 20 MG/100ML; MG/100ML; MG/100ML; MG/100ML
75 INJECTION, SOLUTION INTRAVENOUS CONTINUOUS
Status: DISCONTINUED | OUTPATIENT
Start: 2021-02-27 | End: 2021-02-28 | Stop reason: HOSPADM

## 2021-02-26 RX ORDER — CHLORHEXIDINE GLUCONATE 0.12 MG/ML
15 RINSE ORAL ONCE
Status: DISCONTINUED | OUTPATIENT
Start: 2021-02-26 | End: 2021-02-27 | Stop reason: HOSPADM

## 2021-02-26 RX ORDER — LANOLIN ALCOHOL/MO/W.PET/CERES
6 CREAM (GRAM) TOPICAL
Status: DISCONTINUED | OUTPATIENT
Start: 2021-02-26 | End: 2021-02-28 | Stop reason: HOSPADM

## 2021-02-26 RX ORDER — OXYCODONE HYDROCHLORIDE 10 MG/1
10 TABLET ORAL EVERY 4 HOURS PRN
Status: DISCONTINUED | OUTPATIENT
Start: 2021-02-26 | End: 2021-02-28 | Stop reason: HOSPADM

## 2021-02-26 RX ORDER — HYDROMORPHONE HCL/PF 1 MG/ML
1 SYRINGE (ML) INJECTION EVERY 4 HOURS PRN
Status: DISCONTINUED | OUTPATIENT
Start: 2021-02-26 | End: 2021-02-26 | Stop reason: HOSPADM

## 2021-02-26 RX ORDER — ETOMIDATE 2 MG/ML
20 INJECTION INTRAVENOUS ONCE
Status: COMPLETED | OUTPATIENT
Start: 2021-02-26 | End: 2021-02-26

## 2021-02-26 RX ORDER — IBUPROFEN 600 MG/1
600 TABLET ORAL ONCE
Status: COMPLETED | OUTPATIENT
Start: 2021-02-26 | End: 2021-02-26

## 2021-02-26 RX ORDER — HYDROMORPHONE HCL/PF 1 MG/ML
0.5 SYRINGE (ML) INJECTION
Status: DISCONTINUED | OUTPATIENT
Start: 2021-02-26 | End: 2021-02-28 | Stop reason: HOSPADM

## 2021-02-26 RX ORDER — LORAZEPAM 0.5 MG/1
0.5 TABLET ORAL 2 TIMES DAILY PRN
Status: CANCELLED | OUTPATIENT
Start: 2021-02-26

## 2021-02-26 RX ORDER — HYDROMORPHONE HCL/PF 1 MG/ML
1 SYRINGE (ML) INJECTION EVERY 4 HOURS PRN
Status: CANCELLED | OUTPATIENT
Start: 2021-02-26

## 2021-02-26 RX ORDER — ONDANSETRON 2 MG/ML
4 INJECTION INTRAMUSCULAR; INTRAVENOUS EVERY 4 HOURS PRN
Status: DISCONTINUED | OUTPATIENT
Start: 2021-02-26 | End: 2021-02-28 | Stop reason: HOSPADM

## 2021-02-26 RX ORDER — LANOLIN ALCOHOL/MO/W.PET/CERES
6 CREAM (GRAM) TOPICAL
Status: DISCONTINUED | OUTPATIENT
Start: 2021-02-26 | End: 2021-02-26 | Stop reason: HOSPADM

## 2021-02-26 RX ORDER — ONDANSETRON 4 MG/1
4 TABLET, ORALLY DISINTEGRATING ORAL ONCE
Status: COMPLETED | OUTPATIENT
Start: 2021-02-26 | End: 2021-02-26

## 2021-02-26 RX ORDER — ACETAMINOPHEN 325 MG/1
650 TABLET ORAL EVERY 6 HOURS SCHEDULED
Status: DISCONTINUED | OUTPATIENT
Start: 2021-02-26 | End: 2021-02-28 | Stop reason: HOSPADM

## 2021-02-26 RX ORDER — DEXTROSE, SODIUM CHLORIDE, AND POTASSIUM CHLORIDE 5; .9; .15 G/100ML; G/100ML; G/100ML
100 INJECTION INTRAVENOUS CONTINUOUS
Status: DISCONTINUED | OUTPATIENT
Start: 2021-02-26 | End: 2021-02-26 | Stop reason: HOSPADM

## 2021-02-26 RX ORDER — SENNOSIDES 8.6 MG
1 TABLET ORAL DAILY
Status: DISCONTINUED | OUTPATIENT
Start: 2021-02-27 | End: 2021-02-26

## 2021-02-26 RX ORDER — FENTANYL CITRATE 50 UG/ML
50 INJECTION, SOLUTION INTRAMUSCULAR; INTRAVENOUS ONCE
Status: COMPLETED | OUTPATIENT
Start: 2021-02-26 | End: 2021-02-26

## 2021-02-26 RX ORDER — AMOXICILLIN 250 MG
2 CAPSULE ORAL DAILY
Status: DISCONTINUED | OUTPATIENT
Start: 2021-02-27 | End: 2021-02-28 | Stop reason: HOSPADM

## 2021-02-26 RX ORDER — METHOCARBAMOL 500 MG/1
500 TABLET, FILM COATED ORAL EVERY 6 HOURS SCHEDULED
Status: DISCONTINUED | OUTPATIENT
Start: 2021-02-26 | End: 2021-02-28 | Stop reason: HOSPADM

## 2021-02-26 RX ORDER — OXYCODONE HCL 5 MG/5 ML
5 SOLUTION, ORAL ORAL EVERY 4 HOURS PRN
Status: DISCONTINUED | OUTPATIENT
Start: 2021-02-26 | End: 2021-02-28 | Stop reason: HOSPADM

## 2021-02-26 RX ORDER — HYDROMORPHONE HCL/PF 1 MG/ML
0.5 SYRINGE (ML) INJECTION ONCE
Status: COMPLETED | OUTPATIENT
Start: 2021-02-26 | End: 2021-02-26

## 2021-02-26 RX ORDER — CALCIUM CARBONATE 200(500)MG
1000 TABLET,CHEWABLE ORAL DAILY PRN
Status: DISCONTINUED | OUTPATIENT
Start: 2021-02-26 | End: 2021-02-28 | Stop reason: HOSPADM

## 2021-02-26 RX ORDER — DOCUSATE SODIUM 100 MG/1
100 CAPSULE, LIQUID FILLED ORAL 2 TIMES DAILY
Status: DISCONTINUED | OUTPATIENT
Start: 2021-02-26 | End: 2021-02-28 | Stop reason: HOSPADM

## 2021-02-26 RX ORDER — ONDANSETRON 2 MG/ML
4 INJECTION INTRAMUSCULAR; INTRAVENOUS EVERY 6 HOURS PRN
Status: DISCONTINUED | OUTPATIENT
Start: 2021-02-26 | End: 2021-02-26

## 2021-02-26 RX ORDER — LORAZEPAM 0.5 MG/1
0.5 TABLET ORAL 2 TIMES DAILY PRN
Status: DISCONTINUED | OUTPATIENT
Start: 2021-02-26 | End: 2021-02-28 | Stop reason: HOSPADM

## 2021-02-26 RX ORDER — LORAZEPAM 0.5 MG/1
0.5 TABLET ORAL 2 TIMES DAILY PRN
Status: DISCONTINUED | OUTPATIENT
Start: 2021-02-26 | End: 2021-02-26 | Stop reason: HOSPADM

## 2021-02-26 RX ADMIN — ENOXAPARIN SODIUM 40 MG: 40 INJECTION SUBCUTANEOUS at 20:48

## 2021-02-26 RX ADMIN — ONDANSETRON 4 MG: 4 TABLET, ORALLY DISINTEGRATING ORAL at 06:21

## 2021-02-26 RX ADMIN — ETOMIDATE 20 MG: 20 INJECTION, SOLUTION INTRAVENOUS at 07:32

## 2021-02-26 RX ADMIN — IBUPROFEN 600 MG: 600 TABLET ORAL at 06:21

## 2021-02-26 RX ADMIN — MORPHINE SULFATE 2 MG: 2 INJECTION, SOLUTION INTRAMUSCULAR; INTRAVENOUS at 12:12

## 2021-02-26 RX ADMIN — DOCUSATE SODIUM 100 MG: 100 CAPSULE, LIQUID FILLED ORAL at 18:10

## 2021-02-26 RX ADMIN — MELATONIN 6 MG: at 21:21

## 2021-02-26 RX ADMIN — DEXTROSE, SODIUM CHLORIDE, AND POTASSIUM CHLORIDE 100 ML/HR: 5; .9; .15 INJECTION INTRAVENOUS at 12:03

## 2021-02-26 RX ADMIN — METHOCARBAMOL 500 MG: 500 TABLET ORAL at 18:07

## 2021-02-26 RX ADMIN — ACETAMINOPHEN 650 MG: 325 TABLET, FILM COATED ORAL at 18:09

## 2021-02-26 RX ADMIN — HYDROMORPHONE HYDROCHLORIDE 1 MG: 1 INJECTION, SOLUTION INTRAMUSCULAR; INTRAVENOUS; SUBCUTANEOUS at 09:34

## 2021-02-26 RX ADMIN — HYDROMORPHONE HYDROCHLORIDE 1 MG: 1 INJECTION, SOLUTION INTRAMUSCULAR; INTRAVENOUS; SUBCUTANEOUS at 15:03

## 2021-02-26 RX ADMIN — OXYCODONE HYDROCHLORIDE 10 MG: 10 TABLET ORAL at 21:21

## 2021-02-26 RX ADMIN — GABAPENTIN 300 MG: 300 CAPSULE ORAL at 21:21

## 2021-02-26 RX ADMIN — HYDROMORPHONE HYDROCHLORIDE 0.5 MG: 1 INJECTION, SOLUTION INTRAMUSCULAR; INTRAVENOUS; SUBCUTANEOUS at 18:08

## 2021-02-26 RX ADMIN — FENTANYL CITRATE 50 MCG: 50 INJECTION INTRAMUSCULAR; INTRAVENOUS at 07:20

## 2021-02-26 NOTE — DISCHARGE SUMMARY
Discharge- Karla Ludwig 1985, 38 y o  female MRN: [id number]    Unit/Bed#: -01 Encounter: [id number]    Primary Care Provider: Barbara Corbin DO   Date and time admitted to hospital: 2/26/2021  5:58 AM        Prediabetes  Assessment & Plan  · Hemoglobin A1c is 5 7 sugars are stable  Anxiety  Assessment & Plan  · Continue  Ativan p r n  * Closed trimalleolar fracture of right ankle  Assessment & Plan  · Slipped and fell on ice  Labs reviewed  Pain control morphine and Dilaudid p r n  Tylenol p r n  Gabriela Ada · Postop care will be peer orthopedics  · Discussed with Dr Sugey Triana ortho - after ct done multiple are of fractures- will need complex reconstruction not able to be performed here - accepted by ortho at [facility] -Dr Tre Gaines has spoken to him )  · As discussed with ortho may eat           Discharging Physician / Practitioner: Rosie Solano MD  PCP: Barbara Corbin DO  Admission Date:   Admission Orders (From admission, onward)     Ordered        02/26/21 0809  Inpatient Admission  Once                   Discharge Date: 02/26/21    Resolved Problems  Date Reviewed: 2/26/2021    None          Consultations During Hospital Stay:  · Orthopedic surgery    Procedures Performed:     · None    Significant Findings / Test Results:     · CT of the right lower extremity- Acute medial and posterior malleollar fractures  The medial malleolar fracture is displaced inferiorly by maximum of 4 mm (series 400 image 35 )  The posterior malleolar fracture is displaced posteriorly by 5 mm (series 401 image   38 )     Acute lateral malleolar fracture located approximately 7 cm proximal to the tip of the lateral malleolus  (Series 400 image 47  No significant displacement        Incidental Findings:   · None     Test Results Pending at Discharge (will require follow up):    · None     Outpatient Tests Requested:  · None    Complications:  None    Reason for Admission:  Right trimalleolar fracture    Hospital Course:     Janak Finney is a 38 y o  female patient who originally presented to the hospital on 2/26/2021 due to right try mL E all rib fracture of the right ankle after she slipped and fell on ice  Initially admitted here with consultation to orthopedic she actually had a CT of the lower extremity done which showed multiple areas of fractures as going to require complex reconstruction which is not been able to perform here and she will need to be transferred to [facility]- Dr Gregg Shi spoken to ortho at [facility] - Dr Lou Mejia - accepted patient    Please see above list of diagnoses and related plan for additional information  Condition at Discharge: stable     Discharge Day Visit / Exam:     * Please refer to separate progress note for these details *    Discussion with Family: no    Discharge instructions/Information to patient and family:   See after visit summary for information provided to patient and family  Provisions for Follow-Up Care:  See after visit summary for information related to follow-up care and any pertinent home health orders  Disposition:     [address] Transfer to [facility]    For Discharges to [facility]:   · Not Applicable to this Patient - Not Applicable to this Patient    Planned Readmission: no     Discharge Statement:  I spent >35 minutes discharging the patient  This time was spent on the day of discharge  I had direct contact with the patient on the day of discharge  Greater than 50% of the total time was spent examining patient, answering all patient questions, arranging and discussing plan of care with patient as well as directly providing post-discharge instructions  Additional time then spent on discharge activities  Discharge Medications:  See after visit summary for reconciled discharge medications provided to patient and family        ** Please Note: This note has been constructed using a voice recognition system **

## 2021-02-26 NOTE — CONSULTS
Consultation - Orthopedics   Perez Wilder 38 y o  female MRN: [id number]  Unit/Bed#: -01 Encounter: [id number]      Assessment/Plan     Assessment:    Trimalleolar fracture/ subluxation right ankle; ambulatory dysfunction; obesity; history of anxiety and depression  Plan:   The risks, benefits, options and alternatives of treatment were discussed, including but not limited to: Infection, blood loss, DVT/PE, poor wound healing, anesthetic risks, loss of motion, nerve/blood vessel damage, poor fracture healing and fixation failure  After a thorough discussion of the treatment, she agrees to proceed with surgical fixation  A CT scan is pending to assess the fracture anatomy  At this time, will plan to proceed with surgery later today  She will likely be able to be discharged tomorrow after receiving postoperative antibiotics and physical therapy instructions on nonweightbearing ambulation  History of Present Illness   Physician Requesting Consult: Sylvia Yanez MD  Reason for Consult / Principal Problem:   Fracture subluxation right ankle  HPI: Perez Wilder is a 38y o  year old female who presents with  Injury to her right ankle when she slipped on ice while walking to work at approximately 5:00 a m  this morning  She was brought to the emergency room at [facility] by her sister, x-rays were obtained and she underwent a closed reduction of her subluxation, splinting and was admitted to the medical service  Orthopedic consultation was requested  He denies history of prior injuries to her right ankle and denies any additional injuries  She complains of pain at the right ankle  She denies paresthesias  She denies any head trauma, loss of consciousness, chest pain or shortness of breath  Consults    Review of Systems   Constitutional: Negative  HENT: Negative  Eyes: Negative  Respiratory: Negative  Cardiovascular: Negative  Gastrointestinal: Negative  Endocrine: Negative  Genitourinary: Negative  Musculoskeletal:        Positive as in the HPI   Skin: Negative  Neurological: Negative  Hematological: Negative  Historical Information   Past Medical History:   Diagnosis Date    Anxiety     Depression     GERD (gastroesophageal reflux disease)     Left ureteral calculus     MRSA (methicillin resistant staph aureus) culture positive     5-10 yrs ago approx- right inner thigh - had abscess    Vertigo     Wears glasses      Past Surgical History:   Procedure Laterality Date    ABCESS DRAINAGE      OH CYSTO/URETERO W/LITHOTRIPSY &INDWELL STENT INSRT Left 9/30/2020    Procedure: CYSTO, URETEROSCOPY W/HOLMIUM LASER, , STENT, STONE BASKET EXTRACTION;  Surgeon: Bailee Valadez MD;  Location: [facility];  Service: Urology    TREATMENT FISTULA ANAL      WISDOM TOOTH EXTRACTION       Social History   Social History     Substance and Sexual Activity   Alcohol Use Yes    Frequency: Monthly or less    Drinks per session: 1 or 2    Binge frequency: Never    Comment: liquor     Social History     Substance and Sexual Activity   Drug Use Never     E-Cigarette/Vaping    E-Cigarette Use Never User      E-Cigarette/Vaping Substances     Social History     Tobacco Use   Smoking Status Never Smoker   Smokeless Tobacco Never Used     Family History: non-contributory    Meds/Allergies   all current active meds have been reviewed  Allergies   Allergen Reactions    Ciprofloxacin Other (See Comments)     Swelling behind right ear    Pollen Extract        Objective   Vitals: Blood pressure 128/75, pulse 93, temperature 97 8 °F (36 6 °C), temperature source Temporal, resp  rate 17, height 5' 4" (1 626 m), weight 110 kg (243 lb 2 7 oz), SpO2 97 %, not currently breastfeeding  ,Body mass index is 41 74 kg/m²  No intake or output data in the 24 hours ending 02/26/21 0902  No intake/output data recorded      Invasive Devices     Peripheral Intravenous Line Peripheral IV 02/26/21 Left Hand less than 1 day                Physical Exam :  Patient is alert, oriented and seems to be no in distress, lying in bed in supine position with a splint in place right lower leg  HEENT exam is benign  Heart is regular and peripheral pulses are palpable in the extremities excluding the right lower extremity with a pulses at the ankle cannot be palpated due to the splint  She does have good color and capillary refill in the toes  Lungs clear   abdomen soft and nontender   Skin without lacerations or abrasions   motor and sensory exam is grossly intact except as limited by her injury  Clavicles, ribs and spine nontender  Pelvis nontender with compression  Ortho Exam : The right ankle exam demonstrates the splint in place  The splint was not removed for the patient's comfort  The toes demonstrate good color and capillary refill and she is able to actively flex and extend her toes  She has no tenderness over the calf proximal to the splint  The right knee and hip exams are benign  The right thigh is nontender  The bilateral upper extremity exam and left lower extremity examinations are benign  Imaging Studies: X-rays of the ankle demonstrated a trimalleolar fracture/subluxation on initial x-rays  Postreduction x-rays demonstrate improved position  EKG, Pathology, and Other Studies: I have personally reviewed pertinent reports

## 2021-02-26 NOTE — EMTALA/ACUTE CARE TRANSFER
[facility]  [address] [facility] [address]  Dept: [phone]      ACUTE CARE TRANSFER CONSENT    NAME Jimmy Lea 1985                              MRN [id number]    I have been informed of my rights regarding examination, treatment, and transfer   by Dr Otilia Gonzalez MD    Benefits:      Risks:        Consent for Transfer:  I acknowledge that my medical condition has been evaluated and explained to me by the treating physician or other qualified medical person and/or my attending physician, who has recommended that I be transferred to the service of    at    The above potential benefits of such transfer, the potential risks associated with such transfer, and the probable risks of not being transferred have been explained to me, and I fully understand them  The doctor has explained that, in my case, the benefits of transfer outweigh the risks  I agree to be transferred  I authorize the performance of emergency medical procedures and treatments upon me in both transit and upon arrival at the receiving facility  Additionally, I authorize the release of any and all medical records to the receiving facility and request they be transported with me, if possible  I understand that the safest mode of transportation during a medical emergency is an ambulance and that the Hospital advocates the use of this mode of transport  Risks of traveling to the receiving facility by car, including absence of medical control, life sustaining equipment, such as oxygen, and medical personnel has been explained to me and I fully understand them  (SANTHOSH CORRECT BOX BELOW)  [  ]  I consent to the stated transfer and to be transported by ambulance/helicopter  [  ]  I consent to the stated transfer, but refuse transportation by ambulance and accept full responsibility for my transportation by car    I understand the risks of non-ambulance transfers and I exonerate the Hospital and its staff from any deterioration in my condition that results from this refusal     X___________________________________________    DATE  21  TIME________  Signature of patient or legally responsible individual signing on patient behalf           RELATIONSHIP TO PATIENT_________________________          Provider Certification    NAME Elsi Luna                                         1985                              MRN [id number]    A medical screening exam was performed on the above named patient  Based on the examination:    Condition Necessitating Transfer multiple area malleolar fracture needed complex reconstruction not able to be done here    Patient Condition:      Reason for Transfer:      Transfer Requirements: Facility     · Space available and qualified personnel available for treatment as acknowledged by    · Agreed to accept transfer and to provide appropriate medical treatment as acknowledged by          · Appropriate medical records of the examination and treatment of the patient are provided at the time of transfer   [address] [place] [address], [address] _______  · Transfer will be performed by qualified personnel from    and appropriate transfer equipment as required, including the use of necessary and appropriate life support measures      Provider Certification: I have examined the patient and explained the following risks and benefits of being transferred/refusing transfer to the patient/family:         Based on these reasonable risks and benefits to the patient and/or the unborn child(emil), and based upon the information available at the time of the patients examination, I certify that the medical benefits reasonably to be expected from the provision of appropriate medical treatments at another medical facility outweigh the increasing risks, if any, to the individuals medical condition, and in the case of labor to the unborn child, from effecting the transfer      X____________________________________________ DATE 02/26/21        TIME_______      ORIGINAL - SEND TO MEDICAL RECORDS   COPY - SEND WITH PATIENT DURING TRANSFER

## 2021-02-26 NOTE — PHYSICAL THERAPY NOTE
PHYSICAL THERAPY EVALUATION  NAME:  Yohannes Frost  DATE: 02/26/21    AGE:   38 y o  Mrn:   [id number]  ADMIT DX:  Ankle pain [M25 [id number]]  Closed fracture of right ankle, initial encounter [M17 313Q]    Past Medical History:   Diagnosis Date    Anxiety     Depression     GERD (gastroesophageal reflux disease)     Left ureteral calculus     MRSA (methicillin resistant staph aureus) culture positive     5-10 yrs ago approx- right inner thigh - had abscess    Vertigo     Wears glasses      Length Of Stay: 0  Performed at least 2 patient identifiers during session: Name and Birthday  PHYSICAL THERAPY EVALUATION :      02/26/21 1045   PT Last Visit   PT Visit Date 02/26/21   Note Type   Note type Evaluation   Pain Assessment   Pain Assessment Tool FLACC   Pain Score Worst Possible Pain  (when moving  if not, 9/10)   Pain Rating: FLACC (Rest) - Face 0   Pain Rating: FLACC (Rest) - Legs 0   Pain Rating: FLACC (Rest) - Activity 0   Pain Rating: FLACC (Rest) - Cry 0   Pain Rating: FLACC (Rest) - Consolability 0   Score: FLACC (Rest) 0   Pain Rating: FLACC (Activity) - Face 1   Pain Rating: FLACC (Activity) - Legs 0   Pain Rating: FLACC (Activity) - Activity 0   Pain Rating: FLACC (Activity) - Cry 1   Pain Rating: FLACC (Activity) - Consolability 0   Score: FLACC (Activity) 2   Home Living   Type of Home House  (no PEDRO PABLO)   Home Layout Two level;Bed/bath upstairs  (6+7 steps to 2nd floor L HR)   Bathroom Shower/Tub Tub/shower unit   Bathroom Toilet Standard   Additional Comments Reports living in a [facility] with no PEDRO PABLO, but 6+7 steps with L HR to 2nd floor  Prior Function   Level of [place] Independent with ADLs and functional mobility   Lives With Son  (15 y/o)   ADL Assistance Independent   IADLs Independent  (+ driving)   Falls in the last 6 months 1 to 4  (slipped on ice)   Vocational Full time employment  (scanning)   Comments Reports being independent PTA without AD      Restrictions/Precautions   Weight Bearing Precautions Per Order Yes   RLE Weight Bearing Per Order NWB   Braces or Orthoses Splint  (R ankle)   Other Precautions Chair Alarm; Bed Alarm; Fall Risk;Pain   General   Additional Pertinent History s/p closed reduction and splinting in ED this AM  Plan for surgical intervention this PM   Cognition   Orientation Level Oriented X4   Following Commands Follows all commands and directions without difficulty   RLE Assessment   RLE Assessment   (except R ankle not assessed in splint  )   RLE Overall AROM   R Hip Flexion just > 90 degrees  increased c/o pain in ankle   R Hip ABduction WFL   R Knee Flexion 90 degrees limited by pain   R Knee Extension 0 degrees   R Ankle Dorsiflexion not assessed   R Ankle Plantar Flexion not assessed   Strength RLE   RLE Overall Strength 3-/5   LLE Assessment   LLE Assessment WFL  (5/5)   Light Touch   RLE Light Touch Grossly intact  (intact at toes)   LLE Light Touch Grossly intact   Bed Mobility   Supine to Sit 5  Supervision   Additional items Increased time required;Verbal cues   Additional Comments HOB flat without bedrail  increased time to complete with min cues for technique  discussed NWB status R LE with all mobility  patient verbalized understanding  Transfers   Sit to Stand 5  Supervision   Additional items Increased time required;Verbal cues   Stand to Sit   (stand by assistance)   Additional items Assist x 1; Increased time required;Verbal cues   Stand pivot   (steadying assistance)   Additional items Assist x 1; Increased time required;Verbal cues   Additional Comments verbal and visual instruction provided prior to trial  pt verbalized understanding  use of RW  sit to stand with supervision with min cues for hand placement for safety with use of RW  sit to stand with stand by assistance with min cues for hand placement, R LE placement and controlled descent  spt with RW with steadying assistance with min cues for technique      Ambulation/Elevation   Gait pattern (3 point pattern with L LE WB  R LE NWB throughout)   Gait Assistance   (steadying assistance)   Additional items Assist x 1;Verbal cues   Assistive Device Rolling walker   Distance 12'x1 with RW with 3 point pattern with L LE WB with min verbal cues for sequence and technique  Balance   Static Sitting Normal   Dynamic Sitting Good   Static Standing Fair   Dynamic Standing Fair -   Ambulatory Fair -   Activity Tolerance   Activity Tolerance Patient limited by pain   Medical Staff Made Aware [place] TUCKER   Nurse Made Aware RNLiam   Assessment   Prognosis Good   Problem List Decreased strength;Decreased endurance; Impaired balance;Decreased range of motion;Decreased mobility;Obesity;Pain;Orthopedic restrictions   Barriers to Discharge Decreased caregiver support; Inaccessible home environment   Barriers to Discharge Comments 2nd floor bedroom and bathroom   Goals   Patient Goals "Figure out how I am going to manage at home"   STG Expiration Date 03/08/21   PT Treatment Day 1   Plan   Treatment/Interventions Functional transfer training;LE strengthening/ROM; Elevations; Therapeutic exercise; Endurance training;Patient/family training;Equipment eval/education; Bed mobility;Gait training; Compensatory technique education;Spoke to nursing;Spoke to case management;OT;Spoke to MD   PT Frequency Other (Comment)  (3-5x/week)   Recommendation   PT Discharge Recommendation Home with skilled therapy; Return to previous environment with social support  (HHPT)   Equipment Recommended [place] walker   Change/add to brands4friends?  Yes, Add Accessories   Walker Accessories Other (Comment)  (knee walker attachment)   PT - OK to Discharge   (when medically cleared)   Additional Comments pt sitting in recliner chair with all needs wihtin reach and posey alarm on   AM-PAC Basic Mobility Inpatient   Turning in Bed Without Bedrails 4   Lying on Back to Sitting on Edge of Flat Bed 3   Moving Bed to Chair 3   Standing Up From Chair 3   Walk in Room 3   Climb 3-5 Stairs 3   Basic Mobility Inpatient Raw Score 19   Basic Mobility Standardized Score 42 48     (Please find full objective findings from PT assessment regarding body systems outlined above)  Assessment: Pt is a 38 y o  female seen for PT evaluation s/p admission to [address] [place] [address] 18 on 2/26/2021 with Closed trimalleolar fracture of right ankle  Pt is NWB R LE with plan for surgical intervention this date and continued NWB post-op  Order placed for PT services  Upon evaluation: Pt is presenting with impaired functional mobility due to pain, decreased strength, decreased ROM, decreased endurance, impaired balance, gait deviations, orthopedic restrictions, fall risk and LE edema requiring supervision assistance for bed mobility, supervision to steadying assistance for transfers and steadying assistance for ambulation with RW  Pt's clinical presentation is currently unpredictable given the functional mobility deficits above, especially weakness, decreased ROM, edema of extremities, decreased skin integrity, decreased endurance, gait deviations, pain, decreased activity tolerance and decreased functional mobility tolerance, coupled with fall risks as indicated by AM-PAC 6-Clicks: 46/70 as well as hx of falls, impaired balance and polypharmacy and combined with medical complications of pain impacting overall mobility status, abnormal WBCs and need for input for mobility technique/safety  Pt's PMHx and comorbidities that may affect physical performance and progress include: obesity and pre-diabetes, h/o vertigo  Personal factors affecting pt at time of IE include: anxiety, depression, inaccessible home environment, multi-level environment, inability to perform ADLs, inability to navigate level surfaces without external assistance, inability to ambulate household distances, inability to navigate community distances and recent fall(s)/fall history  Pt will benefit from continued skilled PT services to address deficits as defined above and to maximize level of functional mobility to facilitate return toward PLOF and improved QOL  From PT/mobility standpoint, recommendation at time of d/c would be Home PT, home with family support, with walker and knee walker attachment, possibility of renting a wheelchair pending progress in order to reduce fall risk and maximize pt's functional independence and consistency with mobility in order to facilitate return to PLOF  Recommend ther ex next 1-2 sessions  The patient's AM-PAC Basic Mobility Inpatient Short Form Raw Score is 19, Standardized Score is 42 48  A standardized score less than 42 9 suggests the patient may benefit from discharge to post-acute rehabilitation services  Please also refer to the recommendation of the Physical Therapist for safe discharge planning  However, patient anticipated to progress mobility to facilitate return to home with assistance from family and use of AD  Goals: Pt will: Perform bed mobility tasks with modified I to reposition in bed and prepare for transfers  Pt will perform transfers with modified I to increase Indep in home environment, decrease burden of care and decrease risk for falls and prepare for ambulation  Pt will ambulate with RW with/without knee walker attachment for >/= 76' with  modified I  to increase Indep in home environment, decrease risk for falls, improve activity tolerance and improve gait quality and to access home environment  Pt will complete >/= 12 steps with with unilateral handrailversus bumping up and down on buttock with Supervision to return to multilevel home and decrease risk for falls  Pt will increase R LE strength >/= 1/2 MMT grade to facilitate functional mobility        Parminder Kelley, PT,DPT     PHYSICAL THERAPY TREATMENT NOTE  Time In:1105  Time EFQ:0611  Total Time: 15'      S:  "I am leaning toward the walker attachment"  O:  Sit<>stand with supervision with min cues for hand placement and R foot placement  Ambulated 25'x1 with RW with steadying-->stand by assistance with slow 3 point pattern with min cues for technique  Discussed recommendation for knee walker attachment versus knee scooter  Patient reports being more comfortable with walker attachment for NWB R leg  Handouts provided  Discussed potential for having knee walker attachment delivered to this facility  Discussed stair and curb completion  Bumping up and down on buttock, potential for placing chair near top of stairs to pull self up to once completion and assistance to manage RW upstairs from son  Discussed hopping up and down stairs  At this time, patient will potentially bump up and down and stay downstairs with [facility] until home PT and OT assess for stair completion  Pt declined stair completion this date  Discussed curb completion sequence and technique with RW to ascend curb backwards and descend forward with RW  Pt verbalized understanding  Discussed potentially renting a wheelchair to access community environment as well as home environment prn with R LE NWB  Pt verbalized understanding  A:  Pt was able to ambulate increased distance with decreasing assistance after verbal cues for technique and sequence  R LE extended and NWB throughout  She declined further ambulation as well as declined stair trial  She did verbalize understanding of recommendations for stairs, walker attachment as well as HHPT services  She is limited this date by increased pain, decreased strength R LE, decreased balance and endurance  She will continue to benefit from PT services to maximize LOF  P:  Recommend gait training with RW and knee walker attachment, dynamic standing balance, stair training      Donny Cowan, PT, DPT

## 2021-02-26 NOTE — NURSING NOTE
Peripheral IV kept in place  IV fluids stopped per MD Greg[facility] arrived to  pt at 3:30pm  Belongings sent with pt  Report given to receiving facility

## 2021-02-26 NOTE — OCCUPATIONAL THERAPY NOTE
Occupational Therapy Evaluation     Patient Name: Janak Finney  TRWQV'I Date: 2/26/2021  Problem List  Principal Problem:    Closed trimalleolar fracture of right ankle  Active Problems:    Anxiety    Prediabetes    Past Medical History  Past Medical History:   Diagnosis Date    Anxiety     Depression     GERD (gastroesophageal reflux disease)     Left ureteral calculus     MRSA (methicillin resistant staph aureus) culture positive     5-10 yrs ago approx- right inner thigh - had abscess    Vertigo     Wears glasses      Past Surgical History  Past Surgical History:   Procedure Laterality Date    ABCESS DRAINAGE      ME CYSTO/URETERO W/LITHOTRIPSY &INDWELL STENT INSRT Left 9/30/2020    Procedure: CYSTO, URETEROSCOPY W/HOLMIUM LASER, , STENT, STONE BASKET EXTRACTION;  Surgeon: Ramses Carlos MD;  Location: [facility];  Service: Urology    TREATMENT FISTULA ANAL      WISDOM TOOTH EXTRACTION             02/26/21 1046   Restrictions/Precautions   Weight Bearing Precautions Per Order Yes   RLE Weight Bearing Per Order NWB   Braces or Orthoses Splint  (R ankle)   Other Precautions Chair Alarm; Bed Alarm; Fall Risk;Pain   Pain Assessment   Pain Assessment Tool FLACC   Pain Location/Orientation Orientation: Right;Location: Foot  (ankle)   Pain Rating: FLACC (Rest) - Face 0   Pain Rating: FLACC (Rest) - Legs 0   Pain Rating: FLACC (Rest) - Activity 0   Pain Rating: FLACC (Rest) - Cry 0   Pain Rating: FLACC (Rest) - Consolability 0   Score: FLACC (Rest) 0   Pain Rating: FLACC (Activity) - Face 1   Pain Rating: FLACC (Activity) - Legs 0   Pain Rating: FLACC (Activity) - Activity 0   Pain Rating: FLACC (Activity) - Cry 0   Pain Rating: FLACC (Activity) - Consolability 0   Score: FLACC (Activity) 1   Home Living   Type of Home House  (0 PEDRO PABLO)   Home Layout Two level;Bed/bath upstairs  (6+7 steps to 2nd floor, L HR)   Bathroom Shower/Tub Tub/shower unit   Bathroom Toilet Standard   Additional Comments Pt reports living in a 2 story home with 0 PEDRO PABLO  FF to 2nd floor L HR   Prior Function   Level of [place] Independent with ADLs and functional mobility   Lives With Son  (15 y/o)   ADL Assistance Independent   IADLs Independent  (+ driving)   Falls in the last 6 months 1 to 4  (slipped on ice)   Vocational Full time employment  (scanning)   Comments Pt reports completing ADLs, IADLs, functional ambulation adn cmomunity mobility + driving @ I  pt works full time and lives at home with her 17 y/o son   ADL   Where Assessed Edge of bed   Grooming Assistance 5  Supervision/Setup   Grooming Deficit Setup   UB Dressing Assistance 5  Supervision/Setup    [address]   ([address] )   [address]; Requires assistive device for steadying;Verbal cueing;Supervision/safety; Increased time to complete; Don/doff R sock; Don/doff L sock; Thread RLE into pants; Thread LLE into pants;Pull up over hips   Toileting Assistance    ([address] )   Toileting Deficit Steadying;Verbal cueing;Supervison/safety; Increased time to complete;Grab bar use;Clothing management up;Clothing management down;Perineal hygiene   Additional Comments Pt completing ADL tasks while seated at EOB  UB Dressing and grooming tasks @ S after set-up  LB Dressing including socks/shoes @ Steadying Assist with increased time and UB support from RW  Pt completing toileting tasks @ Steadying Assist with UB support from grab bars and RW   Bed Mobility   Supine to Sit 5  Supervision   Additional items Increased time required;Verbal cues   Transfers   Sit to Stand 5  Supervision   Additional items Increased time required;Verbal cues   Stand to Sit   (SBA)   Additional items Increased time required;Verbal cues   Stand pivot   (SBA)   Additional items Increased time required;Verbal cues   Additional Comments Pt completing functional transfers with use of RW for UB support   STS @ S  SPT @ SBA with increased time adn vc'ing for technique   Balance   Static Sitting Normal   Dynamic Sitting Good   Static Standing Fair   Dynamic Standing Fair -   Activity Tolerance   Activity Tolerance Patient limited by pain; Patient limited by fatigue   Medical Staff Made Aware [facility]   Nurse Made Aware Spoke with RN   RUE Assessment   RUE Assessment WFL   LUE Assessment   LUE Assessment WFL   Hand Function   Gross Motor Coordination Functional   Fine Motor Coordination Functional   Sensation   Light Touch No apparent deficits   Cognition   Overall Cognitive Status WFL   Arousal/Participation Alert; Responsive; Cooperative   Attention Attends with cues to redirect   Orientation Level Oriented X4   Memory Within functional limits   Following Commands Follows all commands and directions without difficulty   Assessment   Limitation Decreased ADL status; Decreased UE strength;Decreased Safe judgement during ADL;Decreased endurance;Decreased self-care trans;Decreased high-level ADLs   Prognosis Good   Assessment Pt is a 39 y/o F admitted to [address] [place] [address] 2/26/2021 d/t experiencing a fall outside resulting in a close trimalleolar R ankle fracture  Pt with planned surgical fixation later this date  Pt with PMHx impacting performance during functional tasks including: anxiety, depression, GERD, MRSA, vertigo  Pt reports living in a 2 story home with 0 PEDRO PABLO  6+7 steps to 2nd floor L HR  Pt reports being completely independent with ADLs, IADLs, functional ambulation, community mobility and working full time  Pt lives at home with her 17 y/o son  Pt does not has a bathroom on 1st floor and would benefit from a BSC  On evaluation, Pt A&Ox4  Pt completing supine>sit @ S  UB Dressing and grooming tasks @ S after set-up  LB Dressing @ [address]  for CM around waist while standing  Pt completing functional transfers with use of RW for UB support @ S for STS and SPT @ SBA  Pt's BUE ROM and MS WFL   Pt presents with decrease activity tolerance, decrease standing balance, decrease performance during ADL tasks, decrease safety awareness , decrease UB MS, increased pain, decrease generalized strength, decrease activity engagement and decrease performance during functional transfers  Pt would benefit from continued acute OT services to address deficits as well as HHOT upon d/c from [address] [place] [address]  Plan   Treatment Interventions ADL retraining;Functional transfer training;UE strengthening/ROM; Endurance training;Patient/family training;Equipment evaluation/education; Fine motor coordination activities; Compensatory technique education;Continued evaluation; Energy conservation; Activityengagement   Goal Expiration Date 03/08/21   OT Frequency 1-2x/wk   Recommendation   Recommendation   ([address] [place] [address])   OT Discharge Recommendation Home with skilled therapy   AM-[facility] Daily Activity Inpatient   Lower Body Dressing 3   Bathing 3   Toileting 3   Upper Body Dressing 4   Grooming 4   Eating 4   Daily Activity Raw Score 21   Daily Activity Standardized Score (Calc for Raw Score >=11) 44 27   AM-[facility] Applied Cognition Inpatient   Following a Speech/Presentation 4   Understanding Ordinary Conversation 4   Taking Medications 4   Remembering Where Things Are Placed or Put Away 4   Remembering List of 4-5 Errands 4   Taking Care of Complicated Tasks 4   Applied Cognition Raw Score 24   Applied Cognition Standardized Score 62 21     The patient's raw score on the AM-PAC Daily Activity inpatient short form is 21, standardized score is 44 27, greater than 39 4  Patients at this level are likely to benefit from DC to home  Please refer to the recommendation of the Occupational Therapist for safe DC planning  Pt goals to be met by 3/8/2021    1  Pt will demonstrate ability to complete LB dressing @ Mod I in order to increase safety and independence during meaningful tasks     2  Pt will demonstrate ability to sylvain/doff socks/shoes while sitting EOB @ Mod I in order to increase safety and independence during meaningful tasks    3  Pt will demonstrate ability to complete toileting tasks including CM and pericare @ Mod I in order to increase safety and independence during meaningful tasks  4  Pt will demonstrate ability to complete EOB, chair, toilet/commode transfers @ Mod I in order to increase performance and participation during functional tasks  5  Pt will demonstrate ability to stand for 10+ minutes while maintaining G balance with use of RW for UB support PRN  6  Pt will demonstrate ability to tolerate 30-35 minute OT session with no vc'ing for deep breathing or use of energy conservation techniques in order to increase activity tolerance during functional tasks  7  Pt will demonstrate Good carryover of use of energy conservation/compensatory strategies during ADLs and functional tasks in order to increase safety and reduce risk for falls  8  Pt will demonstrate Good attention and participation in continued evaluation of functional ambulation house hold distances in order to assist with safe d/c planning  9  Pt will attend to continued cognitive assessments 100% of the time in order to provide most appropriate d/c recommendations  10  Pt will follow 100% simple 2-step commands and be A&O x4 consistently with environmental cues to increase participation in functional activities  11  Pt will identify 3 areas of interest/hobbies and 1 intervention on how to incorporate into daily life in order to increase interaction with environment and peers as well as increase participation in meaningful tasks  12  Pt will demonstrate 100% carryover of BUE HEP in order to increase BUE MS and increase performance during functional tasks upon d/c home      Sunday Jazmine OTR/L

## 2021-02-26 NOTE — PLAN OF CARE
Problem: PHYSICAL THERAPY ADULT  Goal: Performs mobility at highest level of function for planned discharge setting  See evaluation for individualized goals  Description: Treatment/Interventions: Functional transfer training, LE strengthening/ROM, Elevations, Therapeutic exercise, Endurance training, Patient/family training, Equipment eval/education, Bed mobility, Gait training, Compensatory technique education, Spoke to nursing, Spoke to case management, OT, Spoke to MD  Equipment Recommended: Alena Hansen       See flowsheet documentation for full assessment, interventions and recommendations  5/43/0321 [id number] by Lillie Yousif PT  Note: Prognosis: Good  Problem List: Decreased strength, Decreased endurance, Impaired balance, Decreased range of motion, Decreased mobility, Obesity, Pain, Orthopedic restrictions  Pt was able to ambulate increased distance with decreasing assistance after verbal cues for technique and sequence  R LE extended and NWB throughout  She declined further ambulation as well as declined stair trial  She did verbalize understanding of recommendations for stairs, walker attachment as well as HHPT services  She is limited this date by increased pain, decreased strength R LE, decreased balance and endurance  She will continue to benefit from PT services to maximize LOF  Barriers to Discharge: Decreased caregiver support, Inaccessible home environment  Barriers to Discharge Comments: 2nd floor bedroom and bathroom  PT Discharge Recommendation: Home with skilled therapy, Return to previous environment with social support(HHPT)     PT - OK to Discharge: (when medically cleared)    See flowsheet documentation for full assessment

## 2021-02-26 NOTE — PLAN OF CARE
Problem: OCCUPATIONAL THERAPY ADULT  Goal: Performs self-care activities at highest level of function for planned discharge setting  See evaluation for individualized goals  Description: Treatment Interventions: ADL retraining, Functional transfer training, UE strengthening/ROM, Endurance training, Patient/family training, Equipment evaluation/education, Fine motor coordination activities, Compensatory technique education, Continued evaluation, Energy conservation, Activityengagement          See flowsheet documentation for full assessment, interventions and recommendations  Note: Limitation: Decreased ADL status, Decreased UE strength, Decreased Safe judgement during ADL, Decreased endurance, Decreased self-care trans, Decreased high-level ADLs  Prognosis: Good  Assessment: Pt is a 37 y/o F admitted to [address] [place] [address] 2/26/2021 d/t experiencing a fall outside resulting in a close trimalleolar R ankle fracture  Pt with planned surgical fixation later this date  Pt with PMHx impacting performance during functional tasks including: anxiety, depression, GERD, MRSA, vertigo  Pt reports living in a 2 story home with 0 PEDRO PABLO  6+7 steps to 2nd floor L HR  Pt reports being completely independent with ADLs, IADLs, functional ambulation, community mobility and working full time  Pt lives at home with her 15 y/o son  Pt does not has a bathroom on 1st floor and would benefit from a BSC  On evaluation, Pt A&Ox4  Pt completing supine>sit @ S  UB Dressing and grooming tasks @ S after set-up  LB Dressing @ [address]  for CM around waist while standing  Pt completing functional transfers with use of RW for UB support @ S for STS and SPT @ SBA  Pt's BUE ROM and MS WFL   Pt presents with decrease activity tolerance, decrease standing balance, decrease performance during ADL tasks, decrease safety awareness , decrease UB MS, increased pain, decrease generalized strength, decrease activity engagement and decrease performance during functional transfers  Pt would benefit from continued acute OT services to address deficits as well as HHOT upon d/c from [address] [place] [address]    Recommendation: ([address] [place] [address]'[facility] [place])  OT Discharge Recommendation: Home with skilled therapy

## 2021-02-26 NOTE — PLAN OF CARE
Problem: PHYSICAL THERAPY ADULT  Goal: Performs mobility at highest level of function for planned discharge setting  See evaluation for individualized goals  Description: Treatment/Interventions: Functional transfer training, LE strengthening/ROM, Elevations, Therapeutic exercise, Endurance training, Patient/family training, Equipment eval/education, Bed mobility, Gait training, Compensatory technique education, Spoke to nursing, Spoke to case management, OT, Spoke to MD  Equipment Recommended: Vaibhav Lancaster       See flowsheet documentation for full assessment, interventions and recommendations  Note: Prognosis: Good  Problem List: Decreased strength, Decreased endurance, Impaired balance, Decreased range of motion, Decreased mobility, Obesity, Pain, Orthopedic restrictions  Assessment: Pt is a 38 y o  female seen for PT evaluation s/p admission to [address] [place] [address] on 2/26/2021 with Closed trimalleolar fracture of right ankle  Pt is NWB R LE with plan for surgical intervention this date and continued NWB post-op  Order placed for PT services    Upon evaluation: Pt is presenting with impaired functional mobility due to pain, decreased strength, decreased ROM, decreased endurance, impaired balance, gait deviations, orthopedic restrictions, fall risk and LE edema requiring supervision assistance for bed mobility, supervision to steadying assistance for transfers and steadying assistance for ambulation with RW  Pt's clinical presentation is currently unpredictable given the functional mobility deficits above, especially weakness, decreased ROM, edema of extremities, decreased skin integrity, decreased endurance, gait deviations, pain, decreased activity tolerance and decreased functional mobility tolerance, coupled with fall risks as indicated by AM-PAC 6-Clicks: 01/96 as well as hx of falls, impaired balance and polypharmacy and combined with medical complications of pain impacting overall mobility status, abnormal WBCs and need for input for mobility technique/safety  Pt's PMHx and comorbidities that may affect physical performance and progress include: obesity and pre-diabetes, h/o vertigo  Personal factors affecting pt at time of IE include: anxiety, depression, inaccessible home environment, multi-level environment, inability to perform ADLs, inability to navigate level surfaces without external assistance, inability to ambulate household distances, inability to navigate community distances and recent fall(s)/fall history  Pt will benefit from continued skilled PT services to address deficits as defined above and to maximize level of functional mobility to facilitate return toward PLOF and improved QOL  From PT/mobility standpoint, recommendation at time of d/c would be Home PT, home with family support, with walker and knee walker attachment, possibility of renting a wheelchair pending progress in order to reduce fall risk and maximize pt's functional independence and consistency with mobility in order to facilitate return to PLOF  Recommend ther ex next 1-2 sessions  Barriers to Discharge: Decreased caregiver support, Inaccessible home environment  Barriers to Discharge Comments: 2nd floor bedroom and bathroom  PT Discharge Recommendation: Home with skilled therapy, Return to previous environment with social support(HHPT)     PT - OK to Discharge: (when medically cleared)    See flowsheet documentation for full assessment

## 2021-02-26 NOTE — ED PROVIDER NOTES
History  Chief Complaint   Patient presents with    Ankle Injury     Patient slipped on ice and is complaining of right ankle/lower leg pain  Patient denies hitting head or LOC  Ankle Injury  Location:  Right ankle, lateral malleolus   Quality:  Sharp, stabbing   Severity:  Severe  Onset quality:  Sudden  Duration:  1 hour  Timing:  Constant  Progression:  Unchanged  Relieved by:  Nothing  Worsened by:  Bearing weight, movement   Ineffective treatments:  None tried   Associated symptoms: nausea    Associated symptoms: no abdominal pain, no chest pain, no congestion, no fatigue, no fever, no headaches, no loss of consciousness, no rash, no rhinorrhea, no shortness of breath, no vomiting and no wheezing       29-year-old female  Presents to the emergency department after sustaining a right ankle injury after falling on ice  She is unsure if she sustained a inversion or eversion ankle injury  The patient expresses sharp/stabbing pain along the lateral malleolus  The pain radiates to the right ankle joint and forefoot  She currently rates her pain as a 10/10 in severity  The patient was unable to bear weight following the fall  The patient denies any other injuries  She did not strike her head, lose consciousness  She does not take any anticoagulation medications  Movement of her foot and ankle exacerbates her pain; nothing has been able to alleviate her pain  She hasn't taken anything for pain prior to arrival in the ED  Prior to Admission Medications   Prescriptions Last Dose Informant Patient Reported? Taking?    LORazepam (ATIVAN) 0 5 mg tablet   Yes No   Sig: Take 0 5 mg by mouth 2 (two) times a day as needed    Melatonin 10 MG CAPS   Yes No   Sig: Take 10 mg by mouth daily at bedtime    ondansetron (ZOFRAN) 4 mg tablet   No No   Sig: Take 1 tablet (4 mg total) by mouth every 6 (six) hours   Patient taking differently: Take 4 mg by mouth every 8 (eight) hours as needed    oxyCODONE-acetaminophen (PERCOCET) 5-325 mg per tablet   No No   Sig: Take 1 tablet by mouth every 4 (four) hours as needed for moderate pain for up to 15 dosesMax Daily Amount: 6 tablets   oxybutynin (DITROPAN) 5 mg tablet   No No   Sig: Take 1 tablet (5 mg total) by mouth 3 (three) times a day as needed (bladder spasm)   phenazopyridine (PYRIDIUM) 200 mg tablet   No No   Sig: Take 1 tablet (200 mg total) by mouth 3 (three) times a day as needed for bladder spasms   tamsulosin (FLOMAX) 0 4 mg   No No   Sig: Take 1 capsule (0 4 mg total) by mouth every evening   Patient taking differently: Take 0 4 mg by mouth daily       Facility-Administered Medications: None     Past Medical History:   Diagnosis Date    Anxiety     Depression     GERD (gastroesophageal reflux disease)     Left ureteral calculus     MRSA (methicillin resistant staph aureus) culture positive     5-10 yrs ago approx- right inner thigh - had abscess    Vertigo     Wears glasses      Past Surgical History:   Procedure Laterality Date    ABCESS DRAINAGE      MN CYSTO/URETERO W/LITHOTRIPSY &INDWELL STENT INSRT Left 9/30/2020    Procedure: CYSTO, URETEROSCOPY W/HOLMIUM LASER, , STENT, STONE BASKET EXTRACTION;  Surgeon: Lorena Juan MD;  Location: [facility];  Service: Urology    TREATMENT FISTULA ANAL      WISDOM TOOTH EXTRACTION       History reviewed  No pertinent family history  I have reviewed and agree with the history as documented  E-Cigarette/Vaping    E-Cigarette Use Never User      E-Cigarette/Vaping Substances     Social History     Tobacco Use    Smoking status: Never Smoker    Smokeless tobacco: Never Used   Substance Use Topics    Alcohol use: Yes     Frequency: Monthly or less     Drinks per session: 1 or 2     Binge frequency: Never     Comment: liquor    Drug use: Never     Review of Systems   Constitutional: Negative for chills, fatigue and fever  HENT: Negative for congestion, rhinorrhea, sinus pressure and sinus pain      Eyes: Negative for pain and visual disturbance  Respiratory: Negative for chest tightness, shortness of breath and wheezing  Cardiovascular: Negative for chest pain and palpitations  Gastrointestinal: Positive for nausea  Negative for abdominal pain and vomiting  Genitourinary: Negative for flank pain and pelvic pain  Musculoskeletal: Positive for arthralgias, gait problem and joint swelling  Skin: Negative for color change and rash  Neurological: Negative for dizziness, loss of consciousness, weakness, numbness and headaches  Hematological: Does not bruise/bleed easily  Psychiatric/Behavioral: Negative for confusion and decreased concentration  All other systems reviewed and are negative  Physical Exam  Physical Exam  Vitals signs and nursing note reviewed  Exam conducted with a chaperone present  Constitutional:       General: She is in acute distress  Appearance: She is not ill-appearing or toxic-appearing  HENT:      Head: Normocephalic and atraumatic  Right Ear: External ear normal       Left Ear: External ear normal       Nose: No congestion or rhinorrhea  Eyes:      Extraocular Movements: Extraocular movements intact  Neck:      Musculoskeletal: Neck supple  Cardiovascular:      Rate and Rhythm: Normal rate and regular rhythm  Pulses: Normal pulses  Heart sounds: Normal heart sounds  Pulmonary:      Effort: Pulmonary effort is normal       Breath sounds: Normal breath sounds  Abdominal:      General: Abdomen is flat  Palpations: Abdomen is soft  Tenderness: There is no abdominal tenderness  Musculoskeletal:      Comments: TTP along the right lateral malleolus with extension of the pain to the right anterior ankle and forefoot  Mild swelling  No ecchymosis  NVI  Skin:     General: Skin is warm and dry  Capillary Refill: Capillary refill takes less than 2 seconds  Neurological:      General: No focal deficit present        Mental Status: She is alert and oriented to person, place, and time  Cranial Nerves: No cranial nerve deficit  Sensory: No sensory deficit  Motor: No weakness  Psychiatric:         Mood and Affect: Mood normal          Behavior: Behavior normal        Vital Signs  ED Triage Vitals [02/26/21 0602]   Temperature Pulse Respirations Blood Pressure SpO2   97 8 °F (36 6 °C) 101 18 141/73 99 %      Temp Source Heart Rate Source Patient Position - Orthostatic VS BP Location FiO2 (%)   Temporal Monitor Sitting Right arm --      Pain Score       Worst Possible Pain         Vitals:    02/26/21 0735 02/26/21 0735 02/26/21 0739 02/26/21 0750   BP: 123/76 123/76  148/67   Pulse: 89 87 100 100   Patient Position - Orthostatic VS: Lying Lying Lying        ED Medications  Medications   ibuprofen (MOTRIN) tablet 600 mg (600 mg Oral Given 2/26/21 0621)   ondansetron (ZOFRAN-ODT) dispersible tablet 4 mg (4 mg Oral Given 2/26/21 7688)   fentanyl citrate (PF) 100 MCG/2ML 50 mcg (50 mcg Intravenous Given 2/26/21 0720)   etomidate (AMIDATE) 2 mg/mL injection 20 mg (20 mg Intravenous Given 2/26/21 0732)     Diagnostic Studies  Results Reviewed     None             CT lower extremity wo contrast right   Final Result by Michel Azevedo MD (02/26 1118)      Acute trimalleolar fracture as above as above  Workstation performed: [id number]         XR ankle 2 vw right   Final Result by Bijan Almazan MD (02/26 1217)   Interval realignment of trimalleolar fracture  Workstation performed: [id number]         XR ankle 3+ views RIGHT   Final Result by Bijan Almazan MD (02/26 1216)      Trimalleolar fractures with dislocation  Workstation performed: [id number]                Procedures  Procedural Sedation    Date/Time: 2/26/2021 7:39 AM  Performed by: Deshawn Kowalski DO  Authorized by:  Deshawn Kowalski DO     Immediate pre-procedure details:     Reassessment: Patient reassessed immediately prior to procedure      Reviewed: vital signs, relevant labs/tests and NPO status      Verified: bag valve mask available, emergency equipment available, intubation equipment available, IV patency confirmed and oxygen available    Procedure details (see MAR for exact dosages):     Sedation start time:  2/26/2021 7:39 AM    Preoxygenation:  Nonrebreather mask and nasal cannula    Sedation:  Etomidate    Analgesia:  Fentanyl    Intra-procedure monitoring:  Blood pressure monitoring, frequent vital sign checks, continuous pulse oximetry, cardiac monitor and frequent LOC assessments    Intra-procedure events: none      Sedation end time:  2/26/2021 7:50 AM    Total sedation time (minutes):  11  Post-procedure details:     Post-sedation assessment completed:  2/26/2021 7:54 AM    Attendance: Constant attendance by certified staff until patient recovered      Recovery: Patient returned to pre-procedure baseline      Post-sedation assessments completed and reviewed: airway patency, cardiovascular function, hydration status, mental status, nausea/vomiting, pain level and respiratory function      Patient is stable for discharge or admission: yes      Patient tolerance: Tolerated well, no immediate complications  Splint application    Date/Time: 2/26/2021 7:42 AM  Performed by: Makeda Ortega DO  Authorized by: Makeda Ortega DO   Universal Protocol:  Procedure performed by: (Dr Gabino Black)  Consent: Written consent obtained  Consent given by: patient      Pre-procedure details:     Sensation:  Normal  Procedure details:     Laterality:  Right    Location:  Ankle    Ankle:  R ankleCast type:  Short leg    Splint type:  Short leg    Supplies:  Ortho-Glass and cotton padding  Orthopedic injury treatment    Date/Time: 3/26/2021 7:42 AM  Performed by: Makeda Ortega DO  Authorized by:  Makeda Ortega DO     Patient Location:  ED  Other Assisting Provider: Yes (comment) (Dr Gabino Black)    Written consent obtained?: Yes    Risks and benefits: Risks, benefits and alternatives were discussed    Patient states understanding of procedure being performed: Yes    Patient's understanding of procedure matches consent: Yes    Procedure consent matches procedure scheduled: Yes    Site marked: Yes    Injury location:  Ankle  Injury type:  Fracture-dislocation  Fracture type: trimalleolar    Distal perfusion: normal    Neurological function: normal    Range of motion: reduced    General anesthesia used?: Yes    Sedation type: Moderate (conscious) sedation (See separate Procedural Sedation form)  Manipulation performed?: Yes    Skeletal traction used?: Yes    Reduction successful?: Yes    Confirmation: Reduction confirmed by x-ray    Immobilization:  Splint  Splint type:  Short leg  Supplies used:  Ortho-Glass  Neurovascular status: Neurovascularly intact    Distal perfusion: normal    Neurological function: normal    Range of motion: normal    Patient tolerance:  Patient tolerated the procedure well with no immediate complications        MDM  Number of Diagnoses or Management Options  Diagnosis management comments: History and clinical findings are most consistent with the above diagnosis  26-year-old female  Barbra Arriola on ice earlier this morning  Sustained a right ankle injury  X-ray concerning for possible trimalleolar fracture  Right lower leg was neurovascularly intact  This case was discussed with Dr Gayle Singh who recommended reducing the ankle joint  The patient underwent procedural sedation with etomidate for reduction of the right ankle fracture  See procedural note above for further details  The patient tolerated the procedure well  Right foot was neurovascularly intact following reduction and splint placement  The case was signed out to Dr Artemio White  Plan discussed  The patient was stable while under my care      Disposition  Final diagnoses:   Closed fracture of right ankle, initial encounter     Time reflects when diagnosis was documented in both MDM as applicable and the Disposition within this note     Time User Action Codes Description Comment    2/26/2021  7:58 AM Nuha Mehta Add [[id number]] Closed fracture of right ankle, initial encounter       ED Disposition     None      Follow-up Information    None         Patient's Medications   Discharge Prescriptions    No medications on file     No discharge procedures on file      PDMP Review     None          ED Provider  Electronically Signed by       Kvng Heck DO  02/26/21 5259 [facility]   03/03/21 6295

## 2021-02-26 NOTE — PLAN OF CARE
Problem: Potential for Falls  Goal: Patient will remain free of falls  Description: INTERVENTIONS:  - Assess patient frequently for physical needs  -  Identify cognitive and physical deficits and behaviors that affect risk of falls    -  [place] fall precautions as indicated by assessment   - Educate patient/family on patient safety including physical limitations  - Instruct patient to call for assistance with activity based on assessment  - Modify environment to reduce risk of injury  - Consider OT/PT consult to assist with strengthening/mobility  2/26/2021 0945 by Cricket Grimes RN  Outcome: Reanna Fear  2/26/2021 0945 by Cricket Grimes RN  Outcome: Progressing     Problem: PAIN - ADULT  Goal: Verbalizes/displays adequate comfort level or baseline comfort level  Description: Interventions:  - Encourage patient to monitor pain and request assistance  - Assess pain using appropriate pain scale  - Administer analgesics based on type and severity of pain and evaluate response  - Implement non-pharmacological measures as appropriate and evaluate response  - Consider cultural and social influences on pain and pain management  - Notify physician/advanced practitioner if interventions unsuccessful or patient reports new pain  Outcome: Progressing     Problem: INFECTION - ADULT  Goal: Absence or prevention of progression during hospitalization  Description: INTERVENTIONS:  - Assess and monitor for signs and symptoms of infection  - Monitor lab/diagnostic results  - Monitor all insertion sites, i e  indwelling lines, tubes, and drains  - Monitor endotracheal if appropriate and nasal secretions for changes in amount and color  - [place] appropriate cooling/warming therapies per order  - Administer medications as ordered  - Instruct and encourage patient and family to use good hand hygiene technique  - Identify and instruct in appropriate isolation precautions for identified infection/condition  Outcome: Progressing  Goal: Absence of fever/infection during neutropenic period  Description: INTERVENTIONS:  - Monitor WBC    Outcome: Progressing     Problem: SAFETY ADULT  Goal: Patient will remain free of falls  Description: INTERVENTIONS:  - Assess patient frequently for physical needs  -  Identify cognitive and physical deficits and behaviors that affect risk of falls    -  [place] fall precautions as indicated by assessment   - Educate patient/family on patient safety including physical limitations  - Instruct patient to call for assistance with activity based on assessment  - Modify environment to reduce risk of injury  - Consider OT/PT consult to assist with strengthening/mobility  2/26/2021 0945 by Stephany Chiang RN  Outcome: Progressing  2/26/2021 0945 by Stephany Chiang RN  Outcome: Progressing  Goal: Maintain or return to baseline ADL function  Description: INTERVENTIONS:  -  Assess patient's ability to carry out ADLs; assess patient's baseline for ADL function and identify physical deficits which impact ability to perform ADLs (bathing, care of mouth/teeth, toileting, grooming, dressing, etc )  - Assess/evaluate cause of self-care deficits   - Assess range of motion  - Assess patient's mobility; develop plan if impaired  - Assess patient's need for assistive devices and provide as appropriate  - Encourage maximum independence but intervene and supervise when necessary  - Involve family in performance of ADLs  - Assess for home care needs following discharge   - Consider OT consult to assist with ADL evaluation and planning for discharge  - Provide patient education as appropriate  Outcome: Progressing  Goal: Maintain or return mobility status to optimal level  Description: INTERVENTIONS:  - Assess patient's baseline mobility status (ambulation, transfers, stairs, etc )    - Identify cognitive and physical deficits and behaviors that affect mobility  - Identify mobility aids required to assist with transfers and/or ambulation (gait belt, sit-to-stand, lift, walker, cane, etc )  - [place] fall precautions as indicated by assessment  - Record patient progress and toleration of activity level on Mobility SBAR; progress patient to next Phase/Stage  - Instruct patient to call for assistance with activity based on assessment  - Consider rehabilitation consult to assist with strengthening/weightbearing, etc   Outcome: Progressing     Problem: DISCHARGE PLANNING  Goal: Discharge to home or other facility with appropriate resources  Description: INTERVENTIONS:  - Identify barriers to discharge w/patient and caregiver  - Arrange for needed discharge resources and transportation as appropriate  - Identify discharge learning needs (meds, wound care, etc )  - Arrange for interpretive services to assist at discharge as needed  - Refer to Case Management Department for coordinating discharge planning if the patient needs post-hospital services based on physician/advanced practitioner order or complex needs related to functional status, cognitive ability, or social support system  Outcome: Progressing     Problem: Knowledge Deficit  Goal: Patient/family/caregiver demonstrates understanding of disease process, treatment plan, medications, and discharge instructions  Description: Complete learning assessment and assess knowledge base    Interventions:  - Provide teaching at level of understanding  - Provide teaching via preferred learning methods  Outcome: Progressing     Problem: MUSCULOSKELETAL - ADULT  Goal: Maintain or return mobility to safest level of function  Description: INTERVENTIONS:  - Assess patient's ability to carry out ADLs; assess patient's baseline for ADL function and identify physical deficits which impact ability to perform ADLs (bathing, care of mouth/teeth, toileting, grooming, dressing, etc )  - Assess/evaluate cause of self-care deficits   - Assess range of motion  - Assess patient's mobility  - Assess patient's need for assistive devices and provide as appropriate  - Encourage maximum independence but intervene and supervise when necessary  - Involve family in performance of ADLs  - Assess for home care needs following discharge   - Consider OT consult to assist with ADL evaluation and planning for discharge  - Provide patient education as appropriate  Outcome: Progressing  Goal: Maintain proper alignment of affected body part  Description: INTERVENTIONS:  - Support, maintain and protect limb and body alignment  - Provide patient/ family with appropriate education  Outcome: Progressing

## 2021-02-26 NOTE — ED CARE HANDOFF
Emergency Department Sign Out Note        Sign out and transfer of care from Dr Kathryn Carmona  See Separate Emergency Department note  The patient, Nelly Chase, was evaluated by the previous provider for ankle injury  Workup Completed:  X rays  Closed reduction and splinting  Ortho consult    ED Course / Workup Pending (followup): Patient with trimalleolar fracture  Close reduction and splinting performed  And discussed with Orthopedics and request medical admission  At the time that I took over patient care, awaiting for shift change on medical service, then will contact medical service for admission  Patient hemodynamically stable  XR ankle 3+ views RIGHT    (Results Pending)   XR ankle 2 vw right    (Results Pending)              per my interpretation, initial x-ray reveals trimalleolar fracture right ankle with displaced mortise  Postreduction x-ray reveals trimalleolar fracture with improved alignment at the mortise  Procedures  MDM    Disposition  Final diagnoses:   Closed fracture of right ankle, initial encounter     Time reflects when diagnosis was documented in both MDM as applicable and the Disposition within this note     Time User Action Codes Description Comment    2/26/2021  7:58 AM Harpal Ott Add [[id number]] Closed fracture of right ankle, initial encounter       ED Disposition     ED Disposition Condition Date/Time Comment    Admit Stable Fri Feb 26, 2021  8:08 AM Case was discussed with Dr Yehuda Olsen and the patient's admission status was agreed to be Admission Status: inpatient status to the service of Dr Yehuda Olsen   Follow-up Information    None       Patient's Medications   Discharge Prescriptions    No medications on file     No discharge procedures on file         ED Provider  Electronically Signed by     Mauricio Decker MD  02/26/21 1012

## 2021-02-26 NOTE — ASSESSMENT & PLAN NOTE
· Slipped and fell on ice  Labs reviewed  Pain control morphine and Dilaudid p r n  Tylenol p r n  Darletta Pac · Postop care will be peer orthopedics    · Discussed with Dr Jaleel Hutton ortho - after ct done multiple are of fractures- will need complex reconstruction not able to be performed here - accepted by [facility]o [facility] -Dr Pilar Crooks has spoken to him )  · As discussed with ortho may eat

## 2021-02-26 NOTE — TRANSPORTATION MEDICAL NECESSITY
Section I - General Information    Name of Patient: Ambika Quinn                 : 1985    Medicare #: [id number]  Transport Date: 21 (PCS is valid for round trips on this date and for all repetitive trips in the 60-day range as noted below )  Origin: [address]: [address]  Is the pt's stay covered under Medicare Part A (PPS/DRG)   []     Closest appropriate facility? If no, why is transport to more distant facility required? Yes  If hospice pt, is this transport related to pt's terminal illness? NA       Section II - Medical Necessity Questionnaire  Ambulance transportation is medically necessary only if other means of transport are contraindicated or would be potentially harmful to the patient  To meet this requirement, the patient must either be "bed confined" or suffer from a condition such that transport by means other than ambulance is contraindicated by the patient's condition  The following questions must be answered by the medical professional signing below for this form to be valid:    1)  Describe the MEDICAL CONDITION (physical and/or mental) of this patient AT [address] [place] [address] that requires the patient to be transported in an ambulance and why transport by other means is contraindicated by the patient's condition:pt is being transferred for a higher level of care    2) Is the patient "bed confined" as defined below? Yes  To be "be confined" the patient must satisfy all three of the following conditions: (1) unable to get up from bed without Assistance; AND (2) unable to ambulate; AND (3) unable to sit in a chair or wheelchair  3) Can this patient safely be transported by car or wheelchair van (i e , seated during transport without a medical attendant or monitoring)?    No    4) In addition to completing questions 1-3 above, please check any of the following conditions that apply*:   *Note: supporting documentation for any boxes checked must be maintained in the patient's medical records  If hosp-hosp transfer, describe services needed at 2nd facility not available at 1st facility? Moderate/severe pain on movement   Unable to tolerate seated position for time needed to transport   Orthopedic device (backboard, halo, pins, traction, brace, wedge, etc,) requiring special handling during transport   Other(specify) pt is being transfered to a higher level of care, pt with orthopedic devise to LE      Section III - Signature of Physician or Healthcare Professional  I certify that the above information is true and correct based on my evaluation of this patient, and represent that the patient requires transport by ambulance and that other forms of transport are contraindicated  I understand that this information will be used by the Centers for Medicare and Medicaid Services (CMS) to support the determination of medical necessity for ambulance services, and I represent that I have personal knowledge of the patient's condition at time of transport  []  If this box is checked, I also certify that the patient is physically or mentally incapable of signing the ambulance service's claim and that the institution with which I am affiliated has furnished care, services, or assistance to the patient  My signature below is made on behalf of the patient pursuant to 42 CFR §424 36(b)(4)  In accordance with 42 CFR §424 37, the specific reason(s) that the patient is physically or mentally incapable of signing the claim form is as follows: constantino lombardi        Signature of Physician* or Healthcare Professional______________________________________________________________  Signature Date 02/26/21 (For scheduled repetitive transports, this form is not valid for transports performed more than 60 days after this date)    Printed Name & Credentials of Physician or Healthcare Professional (MD, DO, RN, etc )________________________________  *Form must be signed by patient's attending physician for scheduled, repetitive transports   For non-repetitive, unscheduled ambulance transports, if unable to obtain the signature of the attending physician, any of the following may sign (choose appropriate option below)  [] Physician Assistant []  Clinical Nurse Specialist [x]  Registered Nurse  []  Nurse Practitioner  [] Discharge Planner

## 2021-02-26 NOTE — PROGRESS NOTES
The patient's CT scan was reviewed  This demonstrates comminution of the distal tibia including to fragments posteriorly, combination of the medial malleolus and the distal fibular fracture  The complexity of this patient's injury requires more complex reconstruction  I have spoken to Dr Nolberto Garcia and he has accepted transfer to [facility] for her definitive treatment  Her splint was changed  The skin of the lower leg, ankle and foot was inspected and there is no blistering, lacerations or abrasions  There is no significant swelling  A Alvares type dressing was applied followed by application of a U splint  She tolerated this well

## 2021-02-26 NOTE — ASSESSMENT & PLAN NOTE
· Slipped and fell on ice  Labs reviewed  Patient has no cardiac disease she just a prediabetic sugars are controlled  Patient may proceed with surgery discussed with orthopedics will be doing tonight PT OT has been ordered will see her prior to surgery  Pharmacological DVT for relaxes on hold for now  · Pain control morphine and Dilaudid p r n  Tylenol p r n  Dortha Plough · CBC in the morning  · Postop care will be peer orthopedics    · Will be NPO till evening will place on some D5 normal saline at 100 mL/hour

## 2021-02-26 NOTE — H&P
H&P- Britni Flores 1985, 38 y o  female MRN: [id number]    Unit/Bed#: -01 Encounter: [id number]    Primary Care Provider: Romario Britt DO   Date and time admitted to hospital: 2/26/2021  5:58 AM        Prediabetes  Assessment & Plan  · Hemoglobin A1c is 5 7 sugars are stable  Anxiety  Assessment & Plan  · Restart Ativan p r n  * Closed trimalleolar fracture of right ankle  Assessment & Plan  · Slipped and fell on ice  Labs reviewed  Patient has no cardiac disease she just a prediabetic sugars are controlled  Patient may proceed with surgery discussed with orthopedics will be doing tonight PT OT has been ordered will see her prior to surgery  Pharmacological DVT for relaxes on hold for now  · Pain control morphine and Dilaudid p r n  Tylenol p r n  Adrianne Sequin · CBC in the morning  · Postop care will be peer orthopedics  · Will be NPO till evening will place on some D5 normal saline at 100 mL/hour        VTE Prophylaxis: Pharmacologic VTE Prophylaxis contraindicated due to Going for surgery tonight  / sequential compression device   Code Status:  Full code  POLST: There is no POLST form on file for this patient (pre-hospital)  Discussion with family:  No    Anticipated Length of Stay:  Patient will be admitted on an Inpatient basis with an anticipated length of stay of  > 2 midnights  Justification for Hospital Stay:  Right ankle fracture    Total Time for Visit, including Counseling / Coordination of Care: 1 hour  Greater than 50% of this total time spent on direct patient counseling and coordination of care  Chief Complaint:   Right foot pain    History of Present Illness:    Britni Flores is a 38 y o  female who presents with right foot pain she was found to have right trimalleolar fracture of the right ankle  She slipped and fell on ice  Pain is pretty bed right now she is going to be asking for pain medicine  Denies any chest pain or shortness of breath      Review of Systems:    Review of Systems   Constitutional: Negative for chills and fever  HENT: Negative for ear pain and sore throat  Eyes: Negative for pain and visual disturbance  Respiratory: Negative for cough and shortness of breath  Cardiovascular: Negative for chest pain and palpitations  Gastrointestinal: Negative for abdominal pain and vomiting  Genitourinary: Negative for dysuria and hematuria  Musculoskeletal: Negative for arthralgias and back pain  Right foot pain   Skin: Negative for color change and rash  Neurological: Negative for seizures and syncope  All other systems reviewed and are negative  Past Medical and Surgical History:     Past Medical History:   Diagnosis Date    Anxiety     Depression     GERD (gastroesophageal reflux disease)     Left ureteral calculus     MRSA (methicillin resistant staph aureus) culture positive     5-10 yrs ago approx- right inner thigh - had abscess    Vertigo     Wears glasses        Past Surgical History:   Procedure Laterality Date    ABCESS DRAINAGE      FL CYSTO/URETERO W/LITHOTRIPSY &INDWELL STENT INSRT Left 9/30/2020    Procedure: CYSTO, URETEROSCOPY W/HOLMIUM LASER, , STENT, STONE BASKET EXTRACTION;  Surgeon: Nicole Sawant MD;  Location: [facility] OR;  Service: Urology    TREATMENT FISTULA ANAL      WISDOM TOOTH EXTRACTION         Meds/Allergies:    Prior to Admission medications    Medication Sig Start Date End Date Taking?  Authorizing Provider   LORazepam (ATIVAN) 0 5 mg tablet Take 0 5 mg by mouth 2 (two) times a day as needed  7/22/20  Yes Historical Provider, MD   Melatonin 10 MG CAPS Take 10 mg by mouth daily at bedtime    Yes Historical Provider, MD   ondansetron (ZOFRAN) 4 mg tablet Take 1 tablet (4 mg total) by mouth every 6 (six) hours  Patient taking differently: Take 4 mg by mouth every 8 (eight) hours as needed  8/26/20  Yes Yang Perez DO   oxybutynin (DITROPAN) 5 mg tablet Take 1 tablet (5 mg total) by mouth 3 (three) times a day as needed (bladder spasm)  Patient not taking: Reported on 2/26/2021 9/30/20   Jeremy Dyer MD   oxyCODONE-acetaminophen (PERCOCET) 5-325 mg per tablet Take 1 tablet by mouth every 4 (four) hours as needed for moderate pain for up to 15 dosesMax Daily Amount: 6 tablets 9/30/20   Jeremy Dyer MD   phenazopyridine (PYRIDIUM) 200 mg tablet Take 1 tablet (200 mg total) by mouth 3 (three) times a day as needed for bladder spasms 9/30/20   Jeremy Dyer MD   tamsulosin [facility]) 0 4 mg Take 1 capsule (0 4 mg total) by mouth every evening  Patient taking differently: Take 0 4 mg by mouth daily  9/3/20   Jeremy Dyer MD     I have reviewed home medications using allscripts  Allergies: Allergies   Allergen Reactions    Ciprofloxacin Other (See Comments)     Swelling behind right ear    Pollen Extract        Social History:     Marital Status: Single   Substance Use History:   Social History     Substance and Sexual Activity   Alcohol Use Yes    Frequency: Monthly or less    Drinks per session: 1 or 2    Binge frequency: Never    Comment: liquor     Social History     Tobacco Use   Smoking Status Never Smoker   Smokeless Tobacco Never Used     Social History     Substance and Sexual Activity   Drug Use Never       Family History:    History reviewed  No pertinent family history  Physical Exam:     Vitals:   Blood Pressure: 128/75 (02/26/21 0907)  Pulse: 93 (02/26/21 0830)  Temperature: 97 8 °F (36 6 °C) (02/26/21 0602)  Temp Source: Temporal (02/26/21 0602)  Respirations: 17 (02/26/21 0907)  Height: 5' 4" (162 6 cm) (02/26/21 0602)  Weight - Scale: 110 kg (243 lb 2 7 oz) (02/26/21 0609)  SpO2: 97 % (02/26/21 0830)    Physical Exam  Vitals signs and nursing note reviewed  Constitutional:       General: She is not in acute distress  Appearance: She is well-developed  She is obese  HENT:      Head: Normocephalic and atraumatic     Eyes:      Conjunctiva/sclera: Conjunctivae normal    Neck: Musculoskeletal: Neck supple  Cardiovascular:      Rate and Rhythm: Normal rate and regular rhythm  Heart sounds: No murmur  Pulmonary:      Effort: Pulmonary effort is normal  No respiratory distress  Breath sounds: Normal breath sounds  Abdominal:      Palpations: Abdomen is soft  Tenderness: There is no abdominal tenderness  Musculoskeletal:      Comments: Right lower extremity is in a splint   Skin:     General: Skin is warm and dry  Neurological:      General: No focal deficit present  Mental Status: She is alert and oriented to person, place, and time  Mental status is at baseline  Psychiatric:         Mood and Affect: Mood normal              Additional Data:     Lab Results: I have personally reviewed pertinent films in PACS    Results from last 7 days   Lab Units 02/26/21  0952   WBC Thousand/uL 10 84*   HEMOGLOBIN g/dL 14 1   HEMATOCRIT % 42 7   PLATELETS Thousands/uL 248   NEUTROS PCT % 76*   LYMPHS PCT % 17   MONOS PCT % 6   EOS PCT % 0     Results from last 7 days   Lab Units 02/26/21  0952   SODIUM mmol/L 138   POTASSIUM mmol/L 4 0   CHLORIDE mmol/L 105   CO2 mmol/L 26   BUN mg/dL 12   CREATININE mg/dL 0 88   ANION GAP mmol/L 7   CALCIUM mg/dL 8 2*   ALBUMIN g/dL 3 1*   TOTAL BILIRUBIN mg/dL 0 69   ALK PHOS U/L 65   ALT U/L 50   AST U/L 21   GLUCOSE RANDOM mg/dL 131     Results from last 7 days   Lab Units 02/26/21  0952   INR  1 04                   Imaging: I have personally reviewed pertinent films in PACS    XR ankle 3+ views RIGHT    (Results Pending)   XR ankle 2 vw right    (Results Pending)   CT lower extremity wo contrast right    (Results Pending)       EKG, Pathology, and Other Studies Reviewed on Admission:   · EKG: not available    [facility] Records Reviewed: Yes     ** Please Note: This note has been constructed using a voice recognition system   **

## 2021-02-27 ENCOUNTER — ANESTHESIA (INPATIENT)
Dept: PERIOP | Facility: HOSPITAL | Age: 36
DRG: 493 | End: 2021-02-27
Payer: COMMERCIAL

## 2021-02-27 ENCOUNTER — APPOINTMENT (INPATIENT)
Dept: RADIOLOGY | Facility: HOSPITAL | Age: 36
DRG: 493 | End: 2021-02-27
Payer: COMMERCIAL

## 2021-02-27 LAB
ANION GAP SERPL CALCULATED.3IONS-SCNC: 6 MMOL/L (ref 4–13)
BASOPHILS # BLD AUTO: 0.03 THOUSANDS/ΜL (ref 0–0.1)
BASOPHILS NFR BLD AUTO: 0 % (ref 0–1)
BUN SERPL-MCNC: 16 MG/DL (ref 5–25)
CALCIUM SERPL-MCNC: 8.7 MG/DL (ref 8.3–10.1)
CHLORIDE SERPL-SCNC: 111 MMOL/L (ref 100–108)
CO2 SERPL-SCNC: 18 MMOL/L (ref 21–32)
CREAT SERPL-MCNC: 0.85 MG/DL (ref 0.6–1.3)
EOSINOPHIL # BLD AUTO: 0.08 THOUSAND/ΜL (ref 0–0.61)
EOSINOPHIL NFR BLD AUTO: 1 % (ref 0–6)
ERYTHROCYTE [DISTWIDTH] IN BLOOD BY AUTOMATED COUNT: 12.6 % (ref 11.6–15.1)
GFR SERPL CREATININE-BSD FRML MDRD: 88 ML/MIN/1.73SQ M
GLUCOSE SERPL-MCNC: 95 MG/DL (ref 65–140)
HCT VFR BLD AUTO: 41.8 % (ref 34.8–46.1)
HGB BLD-MCNC: 13.1 G/DL (ref 11.5–15.4)
IMM GRANULOCYTES # BLD AUTO: 0.05 THOUSAND/UL (ref 0–0.2)
IMM GRANULOCYTES NFR BLD AUTO: 1 % (ref 0–2)
LYMPHOCYTES # BLD AUTO: 2.2 THOUSANDS/ΜL (ref 0.6–4.47)
LYMPHOCYTES NFR BLD AUTO: 21 % (ref 14–44)
MCH RBC QN AUTO: 29.4 PG (ref 26.8–34.3)
MCHC RBC AUTO-ENTMCNC: 31.3 G/DL (ref 31.4–37.4)
MCV RBC AUTO: 94 FL (ref 82–98)
MONOCYTES # BLD AUTO: 0.89 THOUSAND/ΜL (ref 0.17–1.22)
MONOCYTES NFR BLD AUTO: 9 % (ref 4–12)
NEUTROPHILS # BLD AUTO: 7.23 THOUSANDS/ΜL (ref 1.85–7.62)
NEUTS SEG NFR BLD AUTO: 68 % (ref 43–75)
NRBC BLD AUTO-RTO: 0 /100 WBCS
PLATELET # BLD AUTO: 218 THOUSANDS/UL (ref 149–390)
PMV BLD AUTO: 11 FL (ref 8.9–12.7)
POTASSIUM SERPL-SCNC: 5 MMOL/L (ref 3.5–5.3)
RBC # BLD AUTO: 4.45 MILLION/UL (ref 3.81–5.12)
SODIUM SERPL-SCNC: 135 MMOL/L (ref 136–145)
WBC # BLD AUTO: 10.48 THOUSAND/UL (ref 4.31–10.16)

## 2021-02-27 PROCEDURE — C1713 ANCHOR/SCREW BN/BN,TIS/BN: HCPCS | Performed by: ORTHOPAEDIC SURGERY

## 2021-02-27 PROCEDURE — 27829 TREAT LOWER LEG JOINT: CPT | Performed by: ORTHOPAEDIC SURGERY

## 2021-02-27 PROCEDURE — C1769 GUIDE WIRE: HCPCS | Performed by: ORTHOPAEDIC SURGERY

## 2021-02-27 PROCEDURE — NC001 PR NO CHARGE: Performed by: ORTHOPAEDIC SURGERY

## 2021-02-27 PROCEDURE — 27823 TREATMENT OF ANKLE FRACTURE: CPT | Performed by: ORTHOPAEDIC SURGERY

## 2021-02-27 PROCEDURE — 85025 COMPLETE CBC W/AUTO DIFF WBC: CPT | Performed by: ORTHOPAEDIC SURGERY

## 2021-02-27 PROCEDURE — 73600 X-RAY EXAM OF ANKLE: CPT | Performed by: ORTHOPAEDIC SURGERY

## 2021-02-27 PROCEDURE — 73600 X-RAY EXAM OF ANKLE: CPT

## 2021-02-27 PROCEDURE — 80048 BASIC METABOLIC PNL TOTAL CA: CPT | Performed by: ORTHOPAEDIC SURGERY

## 2021-02-27 DEVICE — 3.5MM CORTEX SCREW SELF-TAPPING 16MM: Type: IMPLANTABLE DEVICE | Site: ANKLE | Status: FUNCTIONAL

## 2021-02-27 DEVICE — 3.5MM CORTEX SCREW SELF-TAPPING 48MM: Type: IMPLANTABLE DEVICE | Site: ANKLE | Status: FUNCTIONAL

## 2021-02-27 DEVICE — 3.5MM CORTEX SCREW SELF-TAPPING 26MM: Type: IMPLANTABLE DEVICE | Site: ANKLE | Status: FUNCTIONAL

## 2021-02-27 DEVICE — 3.5MM LCP T-PLATE 3H HEAD/ 5H SHAFT/67MM-RIGHT ANGLE
Type: IMPLANTABLE DEVICE | Site: ANKLE | Status: FUNCTIONAL
Brand: LCP

## 2021-02-27 DEVICE — LCP ONE-THIRD TUBULAR PLATE WITH COLLAR 10 HOLES/117MM
Type: IMPLANTABLE DEVICE | Site: ANKLE | Status: FUNCTIONAL
Brand: LCP

## 2021-02-27 DEVICE — 4.0MM CANNULATED SCREW SHORT THREAD/40MM: Type: IMPLANTABLE DEVICE | Site: ANKLE | Status: FUNCTIONAL

## 2021-02-27 DEVICE — 3.5MM CORTEX SCREW SELF-TAPPING 32MM: Type: IMPLANTABLE DEVICE | Site: ANKLE | Status: FUNCTIONAL

## 2021-02-27 DEVICE — 3.5MM CORTEX SCREW SELF-TAPPING 12MM: Type: IMPLANTABLE DEVICE | Site: ANKLE | Status: FUNCTIONAL

## 2021-02-27 DEVICE — 4.0MM CANNULATED SCREW SHORT THREAD/30MM: Type: IMPLANTABLE DEVICE | Site: ANKLE | Status: FUNCTIONAL

## 2021-02-27 DEVICE — 3.5MM CORTEX SCREW SELF-TAPPING 14MM: Type: IMPLANTABLE DEVICE | Site: ANKLE | Status: FUNCTIONAL

## 2021-02-27 RX ORDER — DEXAMETHASONE SODIUM PHOSPHATE 10 MG/ML
INJECTION, SOLUTION INTRAMUSCULAR; INTRAVENOUS AS NEEDED
Status: DISCONTINUED | OUTPATIENT
Start: 2021-02-27 | End: 2021-02-27

## 2021-02-27 RX ORDER — ROCURONIUM BROMIDE 10 MG/ML
INJECTION, SOLUTION INTRAVENOUS AS NEEDED
Status: DISCONTINUED | OUTPATIENT
Start: 2021-02-27 | End: 2021-02-27

## 2021-02-27 RX ORDER — MAGNESIUM HYDROXIDE 1200 MG/15ML
LIQUID ORAL AS NEEDED
Status: DISCONTINUED | OUTPATIENT
Start: 2021-02-27 | End: 2021-02-27 | Stop reason: HOSPADM

## 2021-02-27 RX ORDER — HYDROMORPHONE HCL/PF 1 MG/ML
0.5 SYRINGE (ML) INJECTION
Status: DISCONTINUED | OUTPATIENT
Start: 2021-02-27 | End: 2021-02-27 | Stop reason: HOSPADM

## 2021-02-27 RX ORDER — CEFAZOLIN SODIUM 2 G/50ML
SOLUTION INTRAVENOUS AS NEEDED
Status: DISCONTINUED | OUTPATIENT
Start: 2021-02-27 | End: 2021-02-27

## 2021-02-27 RX ORDER — METHOCARBAMOL 750 MG/1
750 TABLET, FILM COATED ORAL EVERY 6 HOURS PRN
Status: DISCONTINUED | OUTPATIENT
Start: 2021-02-27 | End: 2021-02-28 | Stop reason: HOSPADM

## 2021-02-27 RX ORDER — GLYCOPYRROLATE 0.2 MG/ML
INJECTION INTRAMUSCULAR; INTRAVENOUS AS NEEDED
Status: DISCONTINUED | OUTPATIENT
Start: 2021-02-27 | End: 2021-02-27

## 2021-02-27 RX ORDER — LIDOCAINE HYDROCHLORIDE 10 MG/ML
INJECTION, SOLUTION EPIDURAL; INFILTRATION; INTRACAUDAL; PERINEURAL AS NEEDED
Status: DISCONTINUED | OUTPATIENT
Start: 2021-02-27 | End: 2021-02-27

## 2021-02-27 RX ORDER — ONDANSETRON 2 MG/ML
4 INJECTION INTRAMUSCULAR; INTRAVENOUS ONCE AS NEEDED
Status: DISCONTINUED | OUTPATIENT
Start: 2021-02-27 | End: 2021-02-27 | Stop reason: HOSPADM

## 2021-02-27 RX ORDER — DIPHENHYDRAMINE HYDROCHLORIDE 50 MG/ML
12.5 INJECTION INTRAMUSCULAR; INTRAVENOUS ONCE AS NEEDED
Status: DISCONTINUED | OUTPATIENT
Start: 2021-02-27 | End: 2021-02-27 | Stop reason: HOSPADM

## 2021-02-27 RX ORDER — NEOSTIGMINE METHYLSULFATE 1 MG/ML
INJECTION INTRAVENOUS AS NEEDED
Status: DISCONTINUED | OUTPATIENT
Start: 2021-02-27 | End: 2021-02-27

## 2021-02-27 RX ORDER — CEFAZOLIN SODIUM 2 G/50ML
2000 SOLUTION INTRAVENOUS EVERY 8 HOURS
Status: COMPLETED | OUTPATIENT
Start: 2021-02-27 | End: 2021-02-28

## 2021-02-27 RX ORDER — MIDAZOLAM HYDROCHLORIDE 2 MG/2ML
INJECTION, SOLUTION INTRAMUSCULAR; INTRAVENOUS AS NEEDED
Status: DISCONTINUED | OUTPATIENT
Start: 2021-02-27 | End: 2021-02-27

## 2021-02-27 RX ORDER — FENTANYL CITRATE/PF 50 MCG/ML
50 SYRINGE (ML) INJECTION
Status: DISCONTINUED | OUTPATIENT
Start: 2021-02-27 | End: 2021-02-27 | Stop reason: HOSPADM

## 2021-02-27 RX ORDER — DEXMEDETOMIDINE HYDROCHLORIDE 100 UG/ML
INJECTION, SOLUTION INTRAVENOUS AS NEEDED
Status: DISCONTINUED | OUTPATIENT
Start: 2021-02-27 | End: 2021-02-27

## 2021-02-27 RX ORDER — FENTANYL CITRATE 50 UG/ML
INJECTION, SOLUTION INTRAMUSCULAR; INTRAVENOUS AS NEEDED
Status: DISCONTINUED | OUTPATIENT
Start: 2021-02-27 | End: 2021-02-27

## 2021-02-27 RX ORDER — PROPOFOL 10 MG/ML
INJECTION, EMULSION INTRAVENOUS AS NEEDED
Status: DISCONTINUED | OUTPATIENT
Start: 2021-02-27 | End: 2021-02-27

## 2021-02-27 RX ORDER — CEFAZOLIN SODIUM 2 G/50ML
2000 SOLUTION INTRAVENOUS
Status: DISCONTINUED | OUTPATIENT
Start: 2021-02-27 | End: 2021-02-27 | Stop reason: HOSPADM

## 2021-02-27 RX ORDER — KETAMINE HYDROCHLORIDE 50 MG/ML
INJECTION, SOLUTION, CONCENTRATE INTRAMUSCULAR; INTRAVENOUS AS NEEDED
Status: DISCONTINUED | OUTPATIENT
Start: 2021-02-27 | End: 2021-02-27

## 2021-02-27 RX ORDER — OXYCODONE HYDROCHLORIDE 5 MG/1
5 TABLET ORAL EVERY 6 HOURS PRN
Qty: 24 TABLET | Refills: 0 | Status: SHIPPED | OUTPATIENT
Start: 2021-02-27 | End: 2021-02-28 | Stop reason: HOSPADM

## 2021-02-27 RX ADMIN — KETAMINE HYDROCHLORIDE 30 MG: 50 INJECTION, SOLUTION INTRAMUSCULAR; INTRAVENOUS at 08:09

## 2021-02-27 RX ADMIN — SODIUM CHLORIDE, SODIUM LACTATE, POTASSIUM CHLORIDE, AND CALCIUM CHLORIDE: .6; .31; .03; .02 INJECTION, SOLUTION INTRAVENOUS at 09:25

## 2021-02-27 RX ADMIN — ONDANSETRON 4 MG: 2 INJECTION INTRAMUSCULAR; INTRAVENOUS at 08:51

## 2021-02-27 RX ADMIN — MELATONIN 6 MG: at 21:28

## 2021-02-27 RX ADMIN — OXYCODONE HYDROCHLORIDE 10 MG: 10 TABLET ORAL at 17:43

## 2021-02-27 RX ADMIN — FENTANYL CITRATE 50 MCG: 50 INJECTION INTRAMUSCULAR; INTRAVENOUS at 08:08

## 2021-02-27 RX ADMIN — METHOCARBAMOL 500 MG: 500 TABLET ORAL at 00:36

## 2021-02-27 RX ADMIN — METHOCARBAMOL 500 MG: 500 TABLET ORAL at 06:15

## 2021-02-27 RX ADMIN — GLYCOPYRROLATE 0.4 MG: 0.2 INJECTION, SOLUTION INTRAMUSCULAR; INTRAVENOUS at 10:52

## 2021-02-27 RX ADMIN — CEFAZOLIN SODIUM 2000 MG: 2 SOLUTION INTRAVENOUS at 16:03

## 2021-02-27 RX ADMIN — PHENYLEPHRINE HYDROCHLORIDE 100 MCG: 10 INJECTION INTRAVENOUS at 08:17

## 2021-02-27 RX ADMIN — Medication 50 MCG: at 12:29

## 2021-02-27 RX ADMIN — DOCUSATE SODIUM 100 MG: 100 CAPSULE, LIQUID FILLED ORAL at 19:29

## 2021-02-27 RX ADMIN — SODIUM CHLORIDE, SODIUM LACTATE, POTASSIUM CHLORIDE, AND CALCIUM CHLORIDE 75 ML/HR: .6; .31; .03; .02 INJECTION, SOLUTION INTRAVENOUS at 00:36

## 2021-02-27 RX ADMIN — DEXAMETHASONE SODIUM PHOSPHATE 10 MG: 10 INJECTION, SOLUTION INTRAMUSCULAR; INTRAVENOUS at 08:40

## 2021-02-27 RX ADMIN — DEXMEDETOMIDINE HCL 16 MCG: 100 INJECTION INTRAVENOUS at 10:48

## 2021-02-27 RX ADMIN — ACETAMINOPHEN 650 MG: 325 TABLET, FILM COATED ORAL at 06:15

## 2021-02-27 RX ADMIN — OXYCODONE HYDROCHLORIDE 10 MG: 10 TABLET ORAL at 13:39

## 2021-02-27 RX ADMIN — KETAMINE HYDROCHLORIDE 20 MG: 50 INJECTION, SOLUTION INTRAMUSCULAR; INTRAVENOUS at 08:50

## 2021-02-27 RX ADMIN — MIDAZOLAM 2 MG: 1 INJECTION INTRAMUSCULAR; INTRAVENOUS at 08:05

## 2021-02-27 RX ADMIN — FENTANYL CITRATE 50 MCG: 50 INJECTION INTRAMUSCULAR; INTRAVENOUS at 08:47

## 2021-02-27 RX ADMIN — METHOCARBAMOL 500 MG: 500 TABLET ORAL at 13:40

## 2021-02-27 RX ADMIN — PROPOFOL 200 MG: 10 INJECTION, EMULSION INTRAVENOUS at 08:09

## 2021-02-27 RX ADMIN — OXYCODONE HYDROCHLORIDE 10 MG: 10 TABLET ORAL at 04:16

## 2021-02-27 RX ADMIN — ROCURONIUM BROMIDE 50 MG: 50 INJECTION, SOLUTION INTRAVENOUS at 08:09

## 2021-02-27 RX ADMIN — ACETAMINOPHEN 650 MG: 325 TABLET, FILM COATED ORAL at 19:29

## 2021-02-27 RX ADMIN — NEOSTIGMINE METHYLSULFATE 3 MG: 1 INJECTION INTRAVENOUS at 10:52

## 2021-02-27 RX ADMIN — HYDROMORPHONE HYDROCHLORIDE 0.5 MG: 1 INJECTION, SOLUTION INTRAMUSCULAR; INTRAVENOUS; SUBCUTANEOUS at 21:28

## 2021-02-27 RX ADMIN — HYDROMORPHONE HYDROCHLORIDE 0.5 MG: 1 INJECTION, SOLUTION INTRAMUSCULAR; INTRAVENOUS; SUBCUTANEOUS at 08:57

## 2021-02-27 RX ADMIN — SODIUM CHLORIDE, SODIUM LACTATE, POTASSIUM CHLORIDE, AND CALCIUM CHLORIDE 75 ML/HR: .6; .31; .03; .02 INJECTION, SOLUTION INTRAVENOUS at 12:24

## 2021-02-27 RX ADMIN — LIDOCAINE HYDROCHLORIDE 50 MG: 10 INJECTION, SOLUTION EPIDURAL; INFILTRATION; INTRACAUDAL; PERINEURAL at 08:09

## 2021-02-27 RX ADMIN — DEXMEDETOMIDINE HCL 20 MCG: 100 INJECTION INTRAVENOUS at 08:51

## 2021-02-27 RX ADMIN — HYDROMORPHONE HYDROCHLORIDE 0.5 MG: 1 INJECTION, SOLUTION INTRAMUSCULAR; INTRAVENOUS; SUBCUTANEOUS at 14:24

## 2021-02-27 RX ADMIN — METHOCARBAMOL 500 MG: 500 TABLET ORAL at 19:29

## 2021-02-27 RX ADMIN — CEFAZOLIN SODIUM 2000 MG: 2 SOLUTION INTRAVENOUS at 08:15

## 2021-02-27 RX ADMIN — ACETAMINOPHEN 650 MG: 325 TABLET, FILM COATED ORAL at 13:38

## 2021-02-27 RX ADMIN — GABAPENTIN 300 MG: 300 CAPSULE ORAL at 21:27

## 2021-02-27 RX ADMIN — Medication 50 MCG: at 12:43

## 2021-02-27 RX ADMIN — ACETAMINOPHEN 650 MG: 325 TABLET, FILM COATED ORAL at 00:37

## 2021-02-27 NOTE — OP NOTE
OPERATIVE REPORT  PATIENT NAME: Deepti Rodriguez    :  1985  MRN: [id number]  Pt Location:  OR ROOM 04    SURGERY DATE: 2021    Surgeon(s) and Role:     * Amanda Quiroz MD - Primary     * Gilma Matias MD - Assisting     * Reny Lopez MD - Assisting    Preop Diagnosis:  Closed trimalleolar fracture of right ankle, initial encounter [G31 151A]    Post-Op Diagnosis Codes:     * Closed trimalleolar fracture of right ankle, initial encounter [N87 491A]    Procedure(s) (LRB):  OPEN REDUCTION W/ INTERNAL FIXATION (ORIF) ANKLE (Right)    Specimen(s):  * No specimens in log *    Estimated Blood Loss:   Minimal    Drains:  * No LDAs found *    Anesthesia Type:   General    Operative Indications:  Closed trimalleolar fracture of right ankle, initial encounter [W77 968K]      Operative Findings:  Extensive comminution medial malleolus  Intra-articular posterior lateral fragment  Comminuted distal 3rd fibular shaft fracture    Complications:   None    Procedure and Technique:    Open reduction internal fixation trimalleolar ankle fracture  Patient was correctly identified by both the anesthesia department and myself  The right lower extremity was marked  In the operating room general anesthesia was induced  SCD was attached to the contralateral leg  IV antibiotics were administered preoperatively  The patient was then positioned prone onto radiolucent flat top table on gel rolls ensuring that all bony prominences and neurovascular structures were adequately padded and protected  Tourniquet was applied to the right lower extremity  The right lower extremity was then prepped and draped in the usual sterile fashion  A time-out was performed  The extremity was elevated and exsanguinated with the use of an Esmarch  The tourniquet was inflated to a pressure of 300 mmHg  AP lateral imaging demonstrated with the incision being made overlying the lateral fibula and the medial malleolus    A 10 blade was used to create a lateral based incision  Metzenbaum scissors were then used to dissect the fibula protecting the superficial peroneal nerve  The fracture site was readily identified  Subperiosteal dissection was performed exposing the distal fragment by passing the comminution and then exposing proximal to the fracture site  A 10 hole 1/3 tubular plate from [employer] was selected  This was then used to bridge past the fracture ensuring the appropriate length of the fibula as the plate was secured in place with cortical screws  Posterior lateral dissection was performed with the use of the Metzenbaum scissors posterior to the plate  A guidewire was then drilled from posterior to anterior capturing the posterior lateral fragment  This was done under fluoroscopy  Measurement was taken  A 4 0 cannulated short threaded screw was then passed over the guidewire capturing the fragment and further achieving anatomic reduction  Attention was then paid to the medial malleolus  Medial based incision was performed  Metzenbaum scissors were used to perform the exposure avoiding the saphenous vein  The fracture site was identified and found to be comminuted in several pieces anteriorly and posteriorly with extension up the medial aspect of the tibia  The tip of the medial malleolus was identified  A guide pin was placed through the anterior fragment and advanced under fluoroscopic imaging proximally  A measurement was taken and a 4 0 short threaded cannulated screw was passed over the guide pin  A 5 hole 3 5 mm T-plate was then selected  This was pre contour  This was applied in a buttress fashion over the more proximal fragments medially and secured in place with nonlocking cortical screws  AP and lateral imaging demonstrated appropriate alignment symmetric mortise    There was some widening on stress testing medially and as such a single syndesmotic screw was placed from lateral to medial under fluoroscopic guidance  All wounds were then copiously irrigated with normal saline solution  The subcutaneous layers were closed with 0 Vicryl followed by 2 Vicryl suture  The skin layers were closed with nylon suture  A well-molded posterior and U splint was applied  Patient was flipped into the supine position, extubated and taken to recovery room in stable condition         I was present for the entire procedure    Patient Disposition:  PACU      Implants:m [employer] 3 5 mm 1/3 tubular plate- 10 hole, 3 5 mm LCP T-plate- 5 hole, two 4 0 short threaded 40 mm screws (1 medially and 1 into the posterior malleolus piece)    SIGNATURE: Ewelina Llamas MD  DATE: February 27, 2021  TIME: 10:40 AM

## 2021-02-27 NOTE — CASE MANAGEMENT
Cm spoke to pt via unit phone, introduced self, role and discharged process  Pt presented as alert during conversation  Pt lives with 16 yrs old son in an aprtmt, no steps  Pt reported being independent with ADLS PTA  Pt denies any inpt rehab, [facility] services, MH or substance abuse  Pt works full-time and drives independently  Pt PCP is Jas Olivo DO affiliated with [facility] and preferred pharmacy is [facility] in [place]  Per pt, sister to transport upon discharged  CM reviewed d/c planning process including the following: identifying help at home, patient preference for d/c planning needs, Discharge Lounge, [facility] Meds to Bed program, availability of treatment team to discuss questions or concerns patient and/or family may have regarding understanding medications and recognizing signs and symptoms once discharged  CM also encouraged patient to follow up with all recommended appointments after discharge  Patient advised of importance for patient and family to participate in managing patients medical well being  Cm reviewed PT recommendation with freedom of choice  A post acute care recommendation was made by your care team for [facility]  Discussed [place] of Choice with patient  List of agencies given to patient via phone  patient aware the list is custom filtered for them by preference  and that [facility] post acute providers are designated  Pt is agreeable to [facility] services and referral placed to Oanh Starks [facility] and [facility]  Awaiting respond

## 2021-02-27 NOTE — ANESTHESIA POSTPROCEDURE EVALUATION
Post-Op Assessment Note    CV Status:  Stable  Pain Score: 0    Pain management: adequate     Mental Status:  Alert and awake   Hydration Status:  Euvolemic   PONV Controlled:  Controlled   Airway Patency:  Patent      Post Op Vitals Reviewed: Yes      Staff: CRNA         No complications documented      BP   120/59   Temp   97 2   Pulse  88   Resp   16   SpO2   98%

## 2021-02-27 NOTE — ANESTHESIA PREPROCEDURE EVALUATION
Procedure:  OPEN REDUCTION W/ INTERNAL FIXATION (ORIF) ANKLE (Right Ankle)    Relevant Problems   GI/HEPATIC   (+) GERD (gastroesophageal reflux disease)      NEURO/PSYCH   (+) Anxiety   (+) Depression      Other   (+) Closed trimalleolar fracture of right ankle   (+) Morbid obesity due to excess calories (HCC)   (+) Prediabetes        Physical Exam    Airway    Mallampati score: II  TM Distance: >3 FB  Neck ROM: full     Dental   No notable dental hx     Cardiovascular  Cardiovascular exam normal    Pulmonary  Pulmonary exam normal     Other Findings        Anesthesia Plan  ASA Score- 3     Anesthesia Type- general with ASA Monitors  Additional Monitors:   Airway Plan: ETT  Comment: I, Rey Real MD, discussed risks (reviewed with patient on the anesthesia consent form), benefits and alternatives for General Anesthesia  These risks did include breathing tube remaining in place if not strong enough, PONV, damage to lips and teeth, sore throat, eye injury or blindness, risk of heart attack or stroke that may lead to death          Plan Factors-Exercise tolerance (METS): >4 METS  Chart reviewed  Existing labs reviewed  Patient summary reviewed  Patient is not a current smoker  Induction- intravenous  Postoperative Plan- Plan for postoperative opioid use  Informed Consent- Anesthetic plan and risks discussed with patient  I personally reviewed this patient with the CRNA  Discussed and agreed on the Anesthesia Plan with the CRNA          Lab Results   Component Value Date    WBC 10 48 (H) 02/27/2021    HGB 13 1 02/27/2021    HCT 41 8 02/27/2021    MCV 94 02/27/2021     02/27/2021     Lab Results   Component Value Date    CALCIUM 8 2 (L) 02/26/2021    K 4 0 02/26/2021    CO2 26 02/26/2021     02/26/2021    BUN 12 02/26/2021    CREATININE 0 88 02/26/2021     Lab Results   Component Value Date    INR 1 04 02/26/2021    PROTIME 13 4 02/26/2021     Lab Results   Component Value Date    PTT 25 02/26/2021       Type and Screen:  A  No results found for this or any previous visit

## 2021-02-27 NOTE — UTILIZATION REVIEW
Initial Clinical Review    Admission: Date/Time/Statement:   Admission Orders (From admission, onward)     Ordered        02/26/21 0809  Inpatient Admission  Once                   Orders Placed This Encounter   Procedures    Inpatient Admission     Standing Status:   Standing     Number of Occurrences:   1     Order Specific Question:   Level of Care     Answer:   Med Surg [16]     Order Specific Question:   Estimated length of stay     Answer:   More than 2 Midnights     Order Specific Question:   Certification     Answer:   I certify that inpatient services are medically necessary for this patient for a duration of greater than two midnights  See H&P and MD Progress Notes for additional information about the patient's course of treatment  ED Arrival Information     Expected Arrival Acuity Means of Arrival Escorted By Service Admission Type    - 2/26/2021 05:57 Less Urgent Wheelchair Family Member Hospitalist Urgent    Arrival Complaint    ankle pain         Chief Complaint   Patient presents with    Ankle Injury     Patient slipped on ice and is complaining of right ankle/lower leg pain  Patient denies hitting head or LOC  Assessment/Plan:  37 yo old female to ED  Injury to her right ankle when she slipped on ice while walking to work at approximately 5:00 a m  this morning  Found to haveright trimalleolar fracture of the right ankle  Admitted IP status for ortho consult , OR , pain control , cbc in am   NPO and then D 5NS   Plan for transfer to [facility] for ortho and sx      2/26 Ortho consult   Trimalleolar fracture/ subluxation right ankle; ambulatory dysfunction  plan to proceed with surgery later today     ED Triage Vitals [02/26/21 0602]   Temperature Pulse Respirations Blood Pressure SpO2   97 8 °F (36 6 °C) 101 18 141/73 99 %      Temp Source Heart Rate Source Patient Position - Orthostatic VS BP Location FiO2 (%)   Temporal Monitor Sitting Right arm --      Pain Score       Worst Possible Pain Wt Readings from Last 1 Encounters:   02/26/21 110 kg (243 lb 2 7 oz)     Additional Vital Signs:   02/26/21 15:25:14  --  84  --  134/77  96  96 %  --  --  --  --  --   02/26/21 0934  --  --  --  --  --  --  --  --  --  None (Room air)  --   02/26/21 09:07:03  --  --  17  128/75  93  --  --  --  --  --  --   02/26/21 0830  --  93  23Abnormal   145/71  101  97 %  --  --  --  None (Room air)  Lying   02/26/21 0815  --  97  20  146/73  101  98 %  --  --  --  None (Room air)  Lying   02/26/21 0805  --  98  23Abnormal   146/73  99  98 %  --  --  --  None (Room air)  Lying   02/26/21 0803  --  100  17  --  --  --  --  --  --  --  --   02/26/21 0800  --  92  19  148/70  100  99 %  28  --  2 L/min  Nasal cannula  Lying   02/26/21 0757  --  93  21  --  --  --  --  --  --  --  --   02/26/21 0755  --  92  20  144/65  93  100 %  28  --  2 L/min  Nasal cannula  Lying   02/26/21 0751  --  103  21  --  --  --  --  --  --  --  --   02/26/21 0750  --  100  21  148/67  99  100 %  --  10 L/min  --  Non-rebreather mask  --   02/26/21 0748  --  110Abnormal   20  --  --  --  --  --  --  --  --   02/26/21 0746  --  --  --  147/72  98  --  --  --  --  --  --   02/26/21 0745  --  93  23Abnormal   147/72  --  100 %  28  --  2 L/min  Nasal cannula  Lying   02/26/21 0742  --  105  27Abnormal   --  --  --  --  --  --  --  --   02/26/21 0741  --  --  --  150/83  108  --  --  --  --  --  --   02/26/21 0739  --  100  26Abnormal   --  --  100 %  --  10 L/min  --  Non-rebreather mask  Lying   02/26/21 0736  --  96  22  --  --  --  --  --  --  --  --   02/26/21 07:35:15  --  87  24Abnormal   123/76  --  100 %  28  --  2 L/min  Nasal cannula  Lying   02/26/21 0735  --  89  23Abnormal   123/76  93  100 %  --  10 L/min  --  Non-rebreather mask  Lying   02/26/21 07:33:48  --  --  --  --  --  --  --  --  --  Nasal cannula  --   02/26/21 0733  --  86  22  --  --  --  --  --  --  --  --   02/26/21 0732  --  --  --  134/66  94  --  --  --  --  --  -- 02/26/21 0730  --  91  23Abnormal   135/64  92  97 %  28  --  2 L/min  Nasal cannula  --   02/26/21 0647  --  --  --  --  --  --                 Pertinent Labs/Diagnostic Test Results:   2/26 R ankle 3vw  xray Trimalleolar fractures with dislocation  2/26 R ankle 2 vwInterval realignment of trimalleolar fracture    2/26 CT R LEAcute trimalleolar fracture as above as above  2/26 PCXR    No radiographic evidence of acute intrathoracic process on this examination which is somewhat limited by low lung volumes         Results from last 7 days   Lab Units 02/27/21  0532 02/26/21  0952   WBC Thousand/uL 10 48* 10 84*   HEMOGLOBIN g/dL 13 1 14 1   HEMATOCRIT % 41 8 42 7   PLATELETS Thousands/uL 218 248   NEUTROS ABS Thousands/µL 7 23 8 26*     Results from last 7 days   Lab Units 02/27/21  0532 02/26/21  1758 02/26/21  0952   SODIUM mmol/L 135*  --  138   POTASSIUM mmol/L 5 0  --  4 0   CHLORIDE mmol/L 111*  --  105   CO2 mmol/L 18*  --  26   ANION GAP mmol/L 6  --  7   BUN mg/dL 16  --  12   CREATININE mg/dL 0 85  --  0 88   EGFR ml/min/1 73sq m 88  --  85   CALCIUM mg/dL 8 7  --  8 2*   MAGNESIUM mg/dL  --  2 2  --    PHOSPHORUS mg/dL  --  3 5  --      Results from last 7 days   Lab Units 02/26/21  0952   AST U/L 21   ALT U/L 50   ALK PHOS U/L 65   TOTAL PROTEIN g/dL 6 3*   ALBUMIN g/dL 3 1*   TOTAL BILIRUBIN mg/dL 0 69     Results from last 7 days   Lab Units 02/27/21  0532 02/26/21  0952   GLUCOSE RANDOM mg/dL 95 131     Results from last 7 days   Lab Units 02/26/21  0952   PROTIME seconds 13 4   INR  1 04   PTT seconds 25     ED Treatment:   Medication Administration from 02/26/2021 0551 to 02/26/2021 0900       Date/Time Order Dose Route Action     02/26/2021 0621 ibuprofen (MOTRIN) tablet 600 mg 600 mg Oral Given     02/26/2021 0621 ondansetron (ZOFRAN-ODT) dispersible tablet 4 mg 4 mg Oral Given     02/26/2021 0720 fentanyl citrate (PF) 100 MCG/2ML 50 mcg 50 mcg Intravenous Given     02/26/2021 0732 etomidate (AMIDATE) 2 mg/mL injection 20 mg 20 mg Intravenous Given        Past Medical History:   Diagnosis Date    Anxiety     Depression     GERD (gastroesophageal reflux disease)     Left ureteral calculus     MRSA (methicillin resistant staph aureus) culture positive     5-10 yrs ago approx- right inner thigh - had abscess    Vertigo     Wears glasses      Present on Admission:   Closed trimalleolar fracture of right ankle   Anxiety      Admitting Diagnosis: Ankle pain [M25 579]  Closed fracture of right ankle, initial encounter [I12 400S]  Age/Sex: 38 y o  female  Admission Orders:  Scheduled Medications:    IP CONSULT TO ORTHOPEDIC SURGERY    Network Utilization Review Department  ATTENTION: Please call with any questions or concerns to [phone] and carefully listen to the prompts so that you are directed to the right person  All voicemails are confidential   Ruel Suggs all requests for admission clinical reviews, approved or denied determinations and any other requests to dedicated fax number below belonging to the campus where the patient is receiving treatment   List of dedicated fax numbers for the Facilities:  [address] DENIALS (Administrative/Medical Necessity) [phone]   [address] (Maternity/NICU/Pediatrics) [phone]   [address] [place] [address] [address] J[address] Gee [address] (Izaiah Vidal "Nia" 103) [address] [place] [address] [address]dez [address] [place]) [facility] [place] [address] [phone]

## 2021-02-27 NOTE — PROGRESS NOTES
Progress Note - Orthopedics   Ambika Kai 38 y o  female MRN: [id number]  Unit/Bed#: [facility] 808-01      Subjective:    38 y  o female with right ankle trimalleolar fracture  No acute events, no complaints  Pt doing well  Pain controlled  Denies fevers chills, CP, SOB   Patient has been NPO since midnight    Labs:  0   Lab Value Date/Time    HCT 42 7 02/26/2021 0952    HCT 43 2 08/26/2020 0628    HGB 14 1 02/26/2021 0952    HGB 14 4 08/26/2020 0628    INR 1 04 02/26/2021 0952    WBC 10 84 (H) 02/26/2021 0952    WBC 12 37 (H) 08/26/2020 0628       Meds:    Current Facility-Administered Medications:     acetaminophen (TYLENOL) tablet 650 mg, 650 mg, Oral, Q6H [address], Navi Reyna MD, 650 mg at 02/27/21 0615    calcium carbonate (TUMS) chewable tablet 1,000 mg, 1,000 mg, Oral, Daily PRN, Melania Aburto MD    chlorhexidine (PERIDEX) 0 12 % oral rinse 15 mL, 15 mL, Swish & Spit, Once, Melania Aburto MD, Stopped at 02/26/21 1717    docusate sodium (COLACE) capsule 100 mg, 100 mg, Oral, BID, Melania Aburto MD, 100 mg at 02/26/21 1810    gabapentin (NEURONTIN) capsule 300 mg, 300 mg, Oral, HS, Savage Daniels MD, 300 mg at 02/26/21 2121    HYDROmorphone (DILAUDID) injection 0 5 mg, 0 5 mg, Intravenous, Q3H PRN, Navi Reyna MD    lactated ringers infusion, 75 mL/hr, Intravenous, Continuous, Nvai Reyna MD, Last Rate: 75 mL/hr at 02/27/21 0036, 75 mL/hr at 02/27/21 0036    LORazepam (ATIVAN) tablet 0 5 mg, 0 5 mg, Oral, BID PRN, Navi Reyna MD    melatonin tablet 6 mg, 6 mg, Oral, HS, Navi Reyna MD, 6 mg at 02/26/21 2121    methocarbamol (ROBAXIN) tablet 500 mg, 500 mg, Oral, Q6H [address], Navi Reyna MD, 500 mg at 02/27/21 0615    ondansetron (ZOFRAN) injection 4 mg, 4 mg, Intravenous, Q4H PRN, Navi Reyna MD    oxyCODONE (ROXICODONE) immediate release tablet 10 mg, 10 mg, Oral, Q4H PRN, Navi Reyna MD, 10 mg at 02/27/21 0416    oxyCODONE (ROXICODONE) oral solution 5 mg, 5 mg, Oral, Q4H PRN, Riri Pedroza MD    senna-docusate sodium (SENOKOT S) 8 6-50 mg per tablet 2 tablet, 2 tablet, Oral, Daily, Riri Pedroza MD    Blood Culture:   No results found for: BLOODCX    Wound Culture:   No results found for: WOUNDCULT    Ins and Outs:  I/O last 24 hours: In: -   Out: 50 [Urine:50]          Physical:  Vitals:    02/26/21 2155   BP: 119/52   Pulse: 95   Resp: 20   Temp: 98 1 °F (36 7 °C)   SpO2: 96%     Musculoskeletal: right Lower Extremity  · Skin swollen, but able to wrinkle   · Dressing c/d/i  · TTP right ankle  · SILT and able to wiggle toes  2+ DP pulse    Assessment:    38 y  o female with right trimalleolar ankle fracture    Patient planned to go to the OR today for fixation of ankle fracture    Plan:  · NWB RLE in AO splint  · Continue to monitor for acute blood loss anemia, will monitor and administer IVF/prbc as indicated   · NPO for OR  · Elevate and ice RLE  · PT/OT  · Pain control  · DVT ppx on hold for the OR  · Dispo: Ortho will follow    Aniyah Mohan MD

## 2021-02-27 NOTE — UTILIZATION REVIEW
Initial Clinical Review  THIS REVIEW IS FOR [facility]  Admission: Date/Time/Statement:   Admission Orders (From admission, onward)     Ordered        02/26/21 1708  INPATIENT ADMISSION  Once                   Orders Placed This Encounter   Procedures    INPATIENT ADMISSION     Standing Status:   Standing     Number of Occurrences:   1     Order Specific Question:   Level of Care     Answer:   Med Surg [16]     Order Specific Question:   Estimated length of stay     Answer:   More than 2 Midnights     Order Specific Question:   Certification     Answer:   I certify that inpatient services are medically necessary for this patient for a duration of greater than two midnights  See H&P and MD Progress Notes for additional information about the patient's course of treatment  Assessment/Plan: 37 yo fem w/ hx anxiety, gerd, mrsa, L ureteral stone transferred from [address] by EMS to [address] [facility] med surg unit, admitted as inpatient due to closed R trimalleolar ankle fx  She initially fell and was unable to bear weight  Pain worse with activity, improved with rest  Decision was made to transfer for higher level of care and orthopedic intervention  On arrival, R ankle swollen, painful rom, pulses intact  Closed reduction performed under sedation and well padded AO splint was applied  Provide analgesics, keep NPO at midnight for OR tomorrow       2/27 to OR @0846:   Procedure(s) (LRB):  OPEN REDUCTION W/ INTERNAL FIXATION (ORIF) ANKLE (Right)  Anesthesia Type: General  Operative Findings:Extensive comminution medial malleolus  Intra-articular posterior lateral fragment  Comminuted distal 3rd fibular shaft fracture    PM Update: IP  sending referrals for home health care    Temperature Pulse Respirations Blood Pressure SpO2   02/26/21 1717 02/26/21 1717 02/26/21 1717 02/26/21 1717 02/26/21 1717   98 °F (36 7 °C) 87 20 118/68 97 %         Pain Score 02/26/21 2100       Worst Possible Pain          Wt Readings from Last 1 Encounters:   02/26/21 110 kg (243 lb 2 7 oz)     Additional Vital Signs:   Date/Time  Temp  Pulse  Resp  BP   SpO2  Calculated FIO2 (%) -  Nasal Cannula O2 Flow Rate (L/min)  O2 Device    02/27/21 15:36:43  99 °F (37 2 °C)  114Abnormal   --  115/69   89 %Abnormal   -- --  --    02/27/21 1339  --  --  --  --   --  28 2 L/min  Nasal cannula    02/27/21 13:33:38  98 1 °F (36 7 °C)  100  20  134/81   94 %  -- --  --    02/27/21 1300  --  90  20  125/73   97 %  28 2 L/min  Nasal cannula    02/27/21 1245  98 7 °F (37 1 °C)  106Abnormal   20  148/74   98 %  28 2 L/min  Nasal cannula    02/27/21 1230  --  94  22  140/84   99 %  -- --  --    02/27/21 1215  --  116Abnormal   22  120/76   100 %  44 6 L/min  Simple mask    02/27/21 1200  --  86  15  96/67   100 %  44 6 L/min  Simple mask    02/27/21 1145  --  80  16  112/69   99 %  44 6 L/min  Simple mask    02/27/21 1138  97 2 °F (36 2 °C)Abnormal   98  20  120/59   96 %  44 6 L/min  Simple mask    02/26/21 21:55:22  98 1 °F (36 7 °C)  95  20  119/52   96 %  -- --  --        Pertinent Labs/Diagnostic Test Results:     Done at [facility] on 2/26:  R ankle: Trimalleolar fractures with dislocation  R ankle post reduction: Interval realignment of trimalleolar fracture  CT RLE Acute trimalleolar fracture     Done at [facility]:   2/26 PCXR: No radiographic evidence of acute intrathoracic process on this examination which is somewhat limited by low lung volumes         No EKG  Results from last 7 days   Lab Units 02/27/21  0532 02/26/21  0952   WBC Thousand/uL 10 48* 10 84*   HEMOGLOBIN g/dL 13 1 14 1   HEMATOCRIT % 41 8 42 7   PLATELETS Thousands/uL 218 248   NEUTROS ABS Thousands/µL 7 23 8 26*         Results from last 7 days   Lab Units 02/27/21  0532 02/26/21  1758 02/26/21  0952   SODIUM mmol/L 135*  --  138   POTASSIUM mmol/L 5 0  --  4 0   CHLORIDE mmol/L 111*  --  105   CO2 mmol/L 18*  --  26   ANION GAP mmol/L 6  --  7   BUN mg/dL 16  --  12   CREATININE mg/dL 0 85  --  0 88   EGFR ml/min/1 73sq m 88  --  85   CALCIUM mg/dL 8 7  --  8 2*   MAGNESIUM mg/dL  --  2 2  --    PHOSPHORUS mg/dL  --  3 5  --      Results from last 7 days   Lab Units 02/26/21  0952   AST U/L 21   ALT U/L 50   ALK PHOS U/L 65   TOTAL PROTEIN g/dL 6 3*   ALBUMIN g/dL 3 1*   TOTAL BILIRUBIN mg/dL 0 69         Results from last 7 days   Lab Units 02/27/21  0532 02/26/21  0952   GLUCOSE RANDOM mg/dL 95 131     Results from last 7 days   Lab Units 02/26/21  0952   PROTIME seconds 13 4   INR  1 04   PTT seconds 25       Past Medical History:   Diagnosis Date    Anxiety     Depression     GERD (gastroesophageal reflux disease)     Left ureteral calculus     MRSA (methicillin resistant staph aureus) culture positive     5-10 yrs ago approx- right inner thigh - had abscess    Vertigo     Wears glasses      Present on Admission:   Closed trimalleolar fracture of right ankle      Admitting Diagnosis: Closed trimalleolar fracture of right ankle [S82 851A]  Age/Sex: 38 y o  female  Admission Orders:  Scheduled Medications:  acetaminophen, 650 mg, Oral, Q6H [facility]  cefazolin, 2,000 mg, Intravenous, Q8H  docusate sodium, 100 mg, Oral, BID  [START ON 2/28/2021] enoxaparin, 40 mg, Subcutaneous, Q12H [facility]  gabapentin, 300 mg, Oral, HS  melatonin, 6 mg, Oral, HS  methocarbamol, 500 mg, Oral, Q6H [facility]  senna-docusate sodium, 2 tablet, Oral, Daily      Continuous IV Infusions:  lactated ringers, 75 mL/hr, Intravenous, Continuous      PRN Meds:  calcium carbonate, 1,000 mg, Oral, Daily PRN  HYDROmorphone, 0 5 mg, Intravenous, Q3H PRN x 2 on 2/27 @0857, 1424  lactated ringers, 500 mL, Intravenous, Once PRN  LORazepam, 0 5 mg, Oral, BID PRN  methocarbamol, 750 mg, Oral, Q6H PRN  ondansetron, 4 mg, Intravenous, Q4H PRN x 1 2/27 @0851  oxyCODONE, 10 mg, Oral, Q4H PRN x 1 2/26 @2121, x 2 2/27 @0416, 1339  oxyCODONE, 5 mg, Oral, Q4H PRN  sodium chloride, 500 mL, Intravenous, Once PRN          No wt bearing RLE, keep elevated  SCD's  NPO adv to house diet      Network Utilization Review Department  ATTENTION: Please call with any questions or concerns to [phone] and carefully listen to the prompts so that you are directed to the right person  All voicemails are confidential   Nehemiah Day all requests for admission clinical reviews, approved or denied determinations and any other requests to dedicated fax number below belonging to the campus where the patient is receiving treatment   List of dedicated fax numbers for the Facilities:  [address] DENIALS (Administrative/Medical Necessity) [phone]   [address] (Maternity/NICU/Pediatrics) [phone] [address] [facility] [address] Jc Gee 2219 (Izaiah Vidal "Nia" 103) [address] [place] [address] Ruth Hernández 1481 [address] [place] [address] [phone]

## 2021-02-28 VITALS
WEIGHT: 242.51 LBS | RESPIRATION RATE: 20 BRPM | BODY MASS INDEX: 41.4 KG/M2 | TEMPERATURE: 99.3 F | HEART RATE: 86 BPM | DIASTOLIC BLOOD PRESSURE: 90 MMHG | SYSTOLIC BLOOD PRESSURE: 168 MMHG | OXYGEN SATURATION: 97 % | HEIGHT: 64 IN

## 2021-02-28 PROCEDURE — 97163 PT EVAL HIGH COMPLEX 45 MIN: CPT

## 2021-02-28 PROCEDURE — 99024 POSTOP FOLLOW-UP VISIT: CPT | Performed by: ORTHOPAEDIC SURGERY

## 2021-02-28 PROCEDURE — 97166 OT EVAL MOD COMPLEX 45 MIN: CPT

## 2021-02-28 PROCEDURE — 97535 SELF CARE MNGMENT TRAINING: CPT

## 2021-02-28 PROCEDURE — NC001 PR NO CHARGE: Performed by: PHYSICIAN ASSISTANT

## 2021-02-28 RX ORDER — OXYCODONE HYDROCHLORIDE 5 MG/1
5 TABLET ORAL EVERY 6 HOURS PRN
Qty: 30 TABLET | Refills: 0 | Status: SHIPPED | OUTPATIENT
Start: 2021-02-28 | End: 2021-03-08 | Stop reason: SDUPTHER

## 2021-02-28 RX ADMIN — METHOCARBAMOL 500 MG: 500 TABLET ORAL at 05:47

## 2021-02-28 RX ADMIN — SODIUM CHLORIDE, SODIUM LACTATE, POTASSIUM CHLORIDE, AND CALCIUM CHLORIDE 75 ML/HR: .6; .31; .03; .02 INJECTION, SOLUTION INTRAVENOUS at 00:45

## 2021-02-28 RX ADMIN — DOCUSATE SODIUM 100 MG: 100 CAPSULE, LIQUID FILLED ORAL at 07:51

## 2021-02-28 RX ADMIN — DOCUSATE SODIUM AND SENNOSIDES 2 TABLET: 8.6; 5 TABLET ORAL at 07:49

## 2021-02-28 RX ADMIN — CEFAZOLIN SODIUM 2000 MG: 2 SOLUTION INTRAVENOUS at 00:43

## 2021-02-28 RX ADMIN — ENOXAPARIN SODIUM 40 MG: 40 INJECTION SUBCUTANEOUS at 07:48

## 2021-02-28 RX ADMIN — ACETAMINOPHEN 650 MG: 325 TABLET, FILM COATED ORAL at 00:42

## 2021-02-28 RX ADMIN — ACETAMINOPHEN 650 MG: 325 TABLET, FILM COATED ORAL at 05:47

## 2021-02-28 RX ADMIN — OXYCODONE HYDROCHLORIDE 10 MG: 10 TABLET ORAL at 00:42

## 2021-02-28 RX ADMIN — HYDROMORPHONE HYDROCHLORIDE 0.5 MG: 1 INJECTION, SOLUTION INTRAMUSCULAR; INTRAVENOUS; SUBCUTANEOUS at 03:30

## 2021-02-28 RX ADMIN — METHOCARBAMOL 500 MG: 500 TABLET ORAL at 00:42

## 2021-02-28 RX ADMIN — OXYCODONE HYDROCHLORIDE 10 MG: 10 TABLET ORAL at 07:49

## 2021-02-28 NOTE — PLAN OF CARE
Problem: OCCUPATIONAL THERAPY ADULT  Goal: Performs self-care activities at highest level of function for planned discharge setting  See evaluation for individualized goals  Description: Treatment Interventions: ADL retraining, Endurance training, Functional transfer training, Patient/family training, Equipment evaluation/education, Compensatory technique education, Energy conservation  Equipment Recommended: Bedside commode       See flowsheet documentation for full assessment, interventions and recommendations  Outcome: Completed  Note: Limitation: Decreased ADL status, Decreased endurance, Decreased self-care trans, Decreased high-level ADLs  Prognosis: Good  Assessment: PT 36 YO SEEN FOR INITIAL OT EVALUATION WAS ADMITTED TO [facility] FROM [facility] ON 2/26/2021FOLLOWING A FALL ON ICE RESULTING IN A R TRIMALLEOLAR FX  S/P ORIF ON R ANKLE ON 2/27/2021  PT IS NWB ON RLE  PT PMH INCLUDES ANXIETY, DEPRESSION, GERD, VERTIGO, HX OF MRSA, AND L URETERAL CALCULUS  PT RESIDES IN A 2 LEVEL APT WITH HER 16 YO SON AND IS ABLE TO STAY ON 1ST FLOOR W/BSC  PT REPORTS BEING INDEPENDENT WITH ADLS/IADLS/DRIVING AT BASELINE  PT CURRENTLY MIN-MOD A FOR LB ADLS AND S FOR UB ADLS  PT MIN A FOR BED MOBILITY, S FOR FUNCTIONAL TRANSFERS, AND S FOR FUNCTIONAL MOBILITY W/RW  PT LIMITED DUE TO PAIN, WBS, FATIGUE, DECREASED ENDURANCE, DECREASED BALANCE, FALL RISK, AND DECREASED ACTIVITY TOLERANCE  PT EDUCATED ON WBS, COMPENSATORY STRATEGIES, ENERGY CONSERVATION TECHNIQUES, AND DEEP BREATHING TECHNIQUES  The patient's raw score on the AM-PAC Daily Activity inpatient short form is 19, standardized score is 40 22, greater than 39 4  Patients at this level are likely to benefit from DC to home  Please refer to the recommendation of the Occupational Therapist for safe DC planning  FROM OT PERSPECTIVE, D/C REC HOME WITH ADDITIONAL SUPPORT  DME REC INCLUDES BSC  CONT FOR 1 FOLLOW-UP SESSION TO ADDRESS THE FOLLOWING GOALS       OT Discharge Recommendation: Return to previous environment with social support  OT - OK to Discharge:  Yes

## 2021-02-28 NOTE — PHYSICAL THERAPY NOTE
Physical Therapy Evaluation    Patient's Name: Britni Flores    Admitting Diagnosis  Closed trimalleolar fracture of right ankle [S87 [id number]]    Problem List  Patient Active Problem List   Diagnosis    Anxiety    Depression    GERD (gastroesophageal reflux disease)    Left ureteral calculus    Vertigo    MRSA (methicillin resistant staph aureus) culture positive    Morbid obesity due to excess calories ([address] )    Closed trimalleolar fracture of right ankle    Prediabetes       Past Medical History  Past Medical History:   Diagnosis Date    Anxiety     Depression     GERD (gastroesophageal reflux disease)     Left ureteral calculus     MRSA (methicillin resistant staph aureus) culture positive     5-10 yrs ago approx- right inner thigh - had abscess    Vertigo     Wears glasses        Past Surgical History  Past Surgical History:   Procedure Laterality Date    ABCESS DRAINAGE      OH CYSTO/URETERO W/LITHOTRIPSY &INDWELL STENT INSRT Left 9/30/2020    Procedure: CYSTO, URETEROSCOPY W/HOLMIUM LASER, , STENT, STONE BASKET EXTRACTION;  Surgeon: Johanna Keen MD;  Location: [facility];  Service: Urology    TREATMENT FISTULA ANAL      WISDOM TOOTH EXTRACTION          02/28/21 1008   PT Last Visit   PT Visit Date 02/28/21   Note Type   Note type Evaluation   Pain Assessment   Pain Assessment Tool 0-10   Pain Score 7   Pain Location/Orientation Orientation: Right;Location: [facility]   Hospital Pain Intervention(s) Repositioned; Ambulation/increased activity; Elevated   Home Living   Type of Home Apartment   Home Layout Two level;Bed/bath upstairs  (0 [address]LO, 10+10 steps to bedroom/bathroom L HR)   Additional Comments Pt reports she may be able to stay at her mother's house with 1st floor set-up, but there is no room for her son to also stay there and her mother works during the day   Prior Function   Level of [place] Independent with ADLs and functional mobility   Lives With Son  (17 yo)   ADL Assistance Independent   IADLs Independent   Falls in the last 6 months 1 to 4  (slipped on ice)   Vocational Full time employment  (scanning)   Comments Pt reports no other recent falls, no AD at baseline   Restrictions/Precautions   Weight Bearing Precautions Per Order Yes   RLE Weight Bearing Per Order NWB   Braces or Orthoses Splint  (R ankle)   Other Precautions Chair Alarm; Fall Risk;Pain;WBS   General   Family/Caregiver Present No   Cognition   Overall Cognitive Status WFL   Arousal/Participation Alert   Attention Within functional limits   Orientation Level Oriented X4   Following Commands Follows all commands and directions without difficulty   Comments Pt very pleasant and cooperative throughout session   RLE Assessment   RLE Assessment WFL  (ankle splinted not tested, knee ext and hip flx 3+/5)   LLE Assessment   LLE Assessment WNL   Light Touch   RLE Light Touch Grossly intact  (at big toe)   LLE Light Touch Grossly intact   Bed Mobility   Supine to Sit 4  Minimal assistance   Additional items Assist x 1; Increased time required;Verbal cues;LE management   Additional Comments Cesar for R LE   Transfers   Sit to Stand 5  Supervision   Additional items Verbal cues   Stand to Sit 5  Supervision   Additional items Verbal cues   Additional Comments VC's for hand placement with RW, able to maintain NWB appropriately   Ambulation/Elevation   Gait pattern Improper Weight shift;Decreased foot clearance; Short stride; Excessively slow   Gait Assistance 5  Supervision   Additional items Assist x 1   Assistive Device Rolling walker   Distance 20 ft x1, limited by fatigue, maintains NWB appropriately  Pt able to bump up 7 steps with Cesar for R LE management, extra time and rest breaks  Pt required Cesar to control initial lower to sitting with L rail and Cesar to rise at completion of stair training    PT described and demonstrated chair placement at top of stairs, pt agreed with PT that this would not be safe to perform at this time given fatigue from bumping up 7 steps  Pt encouraged to discuss staying with her mother for ease of mobility or discussion with son emptying BSC and staying on 1st floor of apartment  Stair Management Assistance 4  Minimal assist   Additional items Assist x 1   Stair Management Technique One rail L;Bumping   Number of Stairs 7   Balance   Static Sitting Good   Dynamic Sitting Fair   Static Standing Fair   Dynamic Standing Fair -   Ambulatory Fair -   Activity Tolerance   Activity Tolerance Patient limited by fatigue   Medical Staff Made Aware OTMisti   Nurse Made Aware RN updated   Assessment   Prognosis Good   Problem List Decreased strength;Decreased endurance; Impaired balance;Decreased mobility; Decreased safety awareness;Orthopedic restrictions;Pain   Assessment Pt is a 38 y o  female seen for PT evaluation s/p admit to [facility] on 2/26/2021  Pt was admitted with a primary dx of: Slip on ice resulting in closed trimalleolar fracture of R ankle s/p ORIF performed on 2/27  PT now consulted for assessment of mobility and d/c needs  Pt with Up with assistance orders  Pts current comorbidities effecting treatment include: Anxiety, Depression, resides in multi-level apartment, works full-time  Pts current clinical presentation is Unstable/ Unpredictable (high complexity) due to Ongoing medical management for primary dx, Increased reliance on more restrictive AD compared to baseline, Decreased activity tolerance compared to baseline, Fall risk, Increased assistance needed from caregiver at current time, Current WBS, s/p surgical intervention  Prior to admission, pt was independent with all mobility  Upon evaluation, pt currently is requiring Cesar for bed mobility; supervision for transfers and supervision for ambulation 20 ft w/ RW   Pt presents at PT eval functioning below baseline and currently w/ overall mobility deficits 2* to: RLE weakness, impaired balance, decreased endurance, gait deviations, pain, decreased activity tolerance compared to baseline, decreased functional mobility tolerance compared to baseline, fall risk, orthopedic restrictions  Pt currently at a fall risk 2* to impairments listed above  Pt will continue to benefit from skilled acute PT interventions to address stated impairments; to maximize functional mobility; for ongoing pt/ family training; and DME needs  At conclusion of PT session pt returned back in chair and chair alarm engaged with phone and call bell within reach  Pt denies any further questions at this time  Recommend home with family care and HHPT upon hospital D/C  Pt encouraged to discuss discharge plan: back to apartment vs staying at her mother's house with family as noted above  Goals   Patient Goals to go home   STG Expiration Date 03/14/21   Short Term Goal #1 In 14 days pt will be able to: 1  Demonstrate ability to perform all aspects of bed mobility independently to increase functional independence  2  Perform functional transfers with RW independently to facilitate safe return to previous living environment  3   Ambulate 150 ft with RW independently with stable vitals to improve safety with household distances and reduce fall risk  4  Improve LE strength grades by 1 to increase ease of functional mobility with transfers and gait  5  Pt will demonstrate improved balance by one grade in order to decrease risk of falls  6  Bump 10+10 steps with Cesar and 1 HR to simulate apartment  PT Treatment Day 0   Plan   Treatment/Interventions Functional transfer training;LE strengthening/ROM; Therapeutic exercise; Endurance training;Elevations; Patient/family training;Equipment eval/education; Bed mobility;Gait training   PT Frequency Other (Comment)  (3-5x/week)   Recommendation   PT Discharge Recommendation Home with skilled therapy  (family care and HHPT)   Equipment Recommended [address] [facility] Recommended Wheeled walker   Change/add to [employer]?  No   PT - OK to Discharge Yes   AM-PAC Basic Mobility Inpatient   Turning in Bed Without Bedrails 4   Lying on Back to Sitting on Edge of Flat Bed 3   Moving Bed to Chair 3   Standing Up From Chair 3   Walk in Room 3   Climb 3-5 Stairs 3   Basic Mobility Inpatient Raw Score 19   Basic Mobility Standardized Score 42 48       Demetrio Shin, PT, DPT

## 2021-02-28 NOTE — CASE MANAGEMENT
Cm  Spoke with FATIMAH Staples with ortho, pt will be discharged today  Needs RW and BSC and lovenox price checked  Cm spoke with pt and she does not have a RW or BSC at home would prefer if cm order one  DME ordered via paracte  Lovenox and oxycodone sent to [facility] will be $13 34 copay

## 2021-02-28 NOTE — PLAN OF CARE
Problem: PHYSICAL THERAPY ADULT  Goal: Performs mobility at highest level of function for planned discharge setting  See evaluation for individualized goals  Description: Treatment/Interventions: Functional transfer training, LE strengthening/ROM, Therapeutic exercise, Endurance training, Elevations, Patient/family training, Equipment eval/education, Bed mobility, Gait training  Equipment Recommended: Shikha Dave       See flowsheet documentation for full assessment, interventions and recommendations  Note: Prognosis: Good  Problem List: Decreased strength, Decreased endurance, Impaired balance, Decreased mobility, Decreased safety awareness, Orthopedic restrictions, Pain  Assessment: Pt is a 38 y o  female seen for PT evaluation s/p admit to [address] [place] [address] on 2/26/2021  Pt was admitted with a primary dx of: Slip on ice resulting in closed trimalleolar fracture of R ankle s/p ORIF performed on 2/27  PT now consulted for assessment of mobility and d/c needs  Pt with Up with assistance orders  Pts current comorbidities effecting treatment include: Anxiety, Depression, resides in multi-level apartment, works full-time  Pts current clinical presentation is Unstable/ Unpredictable (high complexity) due to Ongoing medical management for primary dx, Increased reliance on more restrictive AD compared to baseline, Decreased activity tolerance compared to baseline, Fall risk, Increased assistance needed from caregiver at current time, Current WBS, s/p surgical intervention  Prior to admission, pt was independent with all mobility  Upon evaluation, pt currently is requiring Cesar for bed mobility; supervision for transfers and supervision for ambulation 20 ft w/ RW   Pt presents at PT eval functioning below baseline and currently w/ overall mobility deficits 2* to: RLE weakness, impaired balance, decreased endurance, gait deviations, pain, decreased activity tolerance compared to baseline, decreased functional mobility tolerance compared to baseline, fall risk, orthopedic restrictions  Pt currently at a fall risk 2* to impairments listed above  Pt will continue to benefit from skilled acute PT interventions to address stated impairments; to maximize functional mobility; for ongoing pt/ family training; and DME needs  At conclusion of PT session pt returned back in chair and chair alarm engaged with phone and call bell within reach  Pt denies any further questions at this time  Recommend home with family care and HHPT upon hospital D/C  Pt encouraged to discuss discharge plan: back to apartment vs staying at her mother's house with family as noted above  PT Discharge Recommendation: Home with skilled therapy(family care and HHPT)     PT - OK to Discharge: Yes    See flowsheet documentation for full assessment

## 2021-02-28 NOTE — OCCUPATIONAL THERAPY NOTE
Occupational Therapy Evaluation     Patient Name: Raul Levi  JPEEV'E Date: 2/28/2021  Problem List  Principal Problem:    Closed trimalleolar fracture of right ankle    Past Medical History  Past Medical History:   Diagnosis Date    Anxiety     Depression     GERD (gastroesophageal reflux disease)     Left ureteral calculus     MRSA (methicillin resistant staph aureus) culture positive     5-10 yrs ago approx- right inner thigh - had abscess    Vertigo     Wears glasses      Past Surgical History  Past Surgical History:   Procedure Laterality Date    ABCESS DRAINAGE      CA CYSTO/URETERO W/LITHOTRIPSY &INDWELL STENT INSRT Left 9/30/2020    Procedure: CYSTO, URETEROSCOPY W/HOLMIUM LASER, , STENT, STONE BASKET EXTRACTION;  Surgeon: Nita Webb MD;  Location: [facility];  Service: Urology    TREATMENT FISTULA ANAL      WISDOM TOOTH EXTRACTION          02/28/21 1003   OT Last Visit   OT Visit Date 02/28/21   Note Type   Note type Evaluation   Restrictions/Precautions   Weight Bearing Precautions Per Order Yes   RLE Weight Bearing Per Order NWB   Braces or Orthoses Splint   Other Precautions Fall Risk;Pain;Multiple lines   Pain Assessment   Pain Assessment Tool 0-10   Pain Score 7  (3/10 AT REST, INCREASED TO 7/10 WITH ACTIVITY  )   Pain Location/Orientation Orientation: Right;Location: [facility] Pain Intervention(s) Repositioned; Ambulation/increased activity; Elevated;Relaxation technique   Home Living   Type of Home Apartment   Home Layout Two level;Bed/bath upstairs  (0 PEDRO PABLO, 10+10 STEPS TO 2ND FLOOR W/ RAILING)   Bathroom Shower/Tub Tub/shower unit   Bathroom Toilet Standard   Bathroom Equipment   (PT REPORTS NO DME )   Home Equipment   (PT REPORTS NO DME)   Additional Comments PT LIVES IN A 2 LEVEL APT WITH 0 PEDRO PABLO TO MAIN FLOOR WITH LIVING ROOM AND KITCHEN, 10 + 10 STEPS TO THE 2ND FLOOR WITH BED/BATHROOM  PT REPORTS SHE CAN STAY ON THE 1ST FLOOR WITH A BSC   PT REPORTS SHE COULD ALSO STAY AT HER MOM'S HOUSE, WHICH IS A 1 FLOOR HOUSE, HOWEVER, PT WOULD PREFER NOT TO STAY THERE DUE TO LIMITED SPACE AND HER SON WOULD BE UNABLE TO STAY WITH HER AND HER MOM IS AWAY MOST OF THE WEEK  Prior Function   Level of [place] Independent with ADLs and functional mobility   Lives With Son   Receives Help From Family   ADL Assistance Independent   IADLs Independent   Falls in the last 6 months 1 to 4  (1 PER PT)   Vocational Full time employment   Lifestyle   Autonomy PT WAS INDEPENDENT WITH ADLS/IADLS/DRIVING AT BASELINE  Reciprocal Relationships PT REPORTS HER SON WILL BE ATTENDING SCHOOL VIRTUALLY FROM HOME AND WILL BE ABLE TO PROVIDE LIMITED ASSISTANCE DURING THE DAY  PT REPORTS LOCAL SISTER WILL ALSO ASSIST WITH MEAL PREP  Service to Others PT WORKS FULL-TIME AS A SCANNER AT [address]  PRIOR TO COVID-19, PT WAS IN SCHOOL FOR PSYCHOLOGY  Intrinsic Gratification PT ENJOYS  [address]     Psychosocial   Psychosocial (WDL) WDL   ADL   Eating Assistance 6  Modified independent   Grooming Assistance 6  Modified Independent   UB Bathing Assistance 5  Supervision/Setup   LB Bathing Assistance 4  Minimal Assistance   UB Dressing Assistance 5  Supervision/Setup   LB Dressing Assistance 3  Moderate Assistance   Toileting Assistance  4  Minimal Assistance   Functional Assistance 5  Supervision/Setup   Bed Mobility   Supine to Sit 4  Minimal assistance   Additional items Increased time required;Verbal cues;LE management   Sit to Supine Unable to assess  (PT LEFT SEATED IN CHAIR W/ ALL NEEDS IN REACH )   Transfers   Sit to Stand 5  Supervision   Additional items Verbal cues   Stand to Sit 5  Supervision   Additional items Verbal cues   Functional Mobility   Functional Mobility 5  Supervision   Additional Comments INCREASED TIME AND VC FOR SAFETY   Additional items Rolling walker   Balance   Static Sitting Good   Static Standing Fair   Ambulatory Fair -   Activity Tolerance   Activity Tolerance Patient limited by fatigue   Medical Staff Made Aware HORTENCIA OT; Jone Cranker, PT   Nurse Made Aware PT APPROPRIATE TO SEE PER NURSING  RUE Assessment   RUE Assessment WFL   LUE Assessment   LUE Assessment WFL   Cognition   Overall Cognitive Status WFL   Arousal/Participation Alert; Cooperative   Attention Attends with cues to redirect   Orientation Level Oriented X4   Memory Within functional limits   Following Commands Follows all commands and directions without difficulty   Comments PT PLEASANT AND COOPERATIVE THROUGHOUT SESSION  PT ABLE TO RECALL AND CARRY OVER WBS THROUGHOUT SESSION  PT MILDLY IMPULSIVE DUE TO PREVIOUS INDEPENDENCE  Assessment   Limitation Decreased ADL status; Decreased endurance;Decreased self-care trans;Decreased high-level ADLs   Prognosis Good   Assessment PT 36 YO SEEN FOR INITIAL OT EVALUATION WAS [facility] ON [facility]/26/2021FOLLOWING A FALL ON ICE RESULTING IN A R TRIMALLEOLAR FX  S/P ORIF ON R ANKLE ON 2/27/2021  PT IS NWB ON RLE  PT PMH INCLUDES ANXIETY, DEPRESSION, GERD, VERTIGO, HX OF MRSA, AND L URETERAL CALCULUS  PT RESIDES IN A 2 LEVEL APT WITH HER 16 YO SON AND IS ABLE TO STAY ON 1ST FLOOR W/BSC  PT REPORTS BEING INDEPENDENT WITH ADLS/IADLS/DRIVING AT BASELINE  PT CURRENTLY MIN-MOD A FOR LB ADLS AND S FOR UB ADLS  PT MIN A FOR BED MOBILITY, S FOR FUNCTIONAL TRANSFERS, AND S FOR FUNCTIONAL MOBILITY W/RW  PT LIMITED DUE TO PAIN, WBS, FATIGUE, DECREASED ENDURANCE, DECREASED BALANCE, FALL RISK, AND DECREASED ACTIVITY TOLERANCE  PT EDUCATED ON WBS, COMPENSATORY STRATEGIES, ENERGY CONSERVATION TECHNIQUES, AND DEEP BREATHING TECHNIQUES  The patient's raw score on the AM-PAC Daily Activity inpatient short form is 19, standardized score is 40 22, greater than 39 4  Patients at this level are likely to benefit from DC to home  Please refer to the recommendation of the Occupational Therapist for safe DC planning   FROM OT PERSPECTIVE, D/C REC HOME WITH ADDITIONAL SUPPORT  DME REC INCLUDES BSC  CONT FOR 1 FOLLOW-UP SESSION TO ADDRESS THE FOLLOWING GOALS  Goals   Patient Goals TO GO HOME   STG Time Frame 1-3   Short Term Goal #1 SEE BELOW   Plan   Treatment Interventions ADL retraining; Endurance training;Functional transfer training;Patient/family training;Equipment evaluation/education; Compensatory technique education; Energy conservation   Goal Expiration Date 03/01/21   OT Frequency Other (comment)  (1 F/U TX)   Additional Treatment Session   Start Time 0945   End Time 1003   Treatment Assessment PT SEEN FOR OT FOLLOW-UP TREATMENT SESSION FOCUSING ON ADLS, EDUCATION, AND D/C PLANNING  PT EDUCATED ON COMPENSATORY STRATEGIES  PT MOD A FOR LB DRESSING TO THREAD LE AND PULL UP OVER B/L HIPS  PT REPORTS PLAN TO WEAR DRESSES IN ORDER TO LIMIT LB ADLS AND INCREASE INDEPENDENCE  PT EDUCATED ON ENERGY CONSERVATION TECHNIQUES TO UTILIZE FOR LT MEAL PREP AND ADLS  PT EDUCATED ON HOME REC INCLUDING STAYING ON 1ST FLOOR OF APT VS IN HER MOM'S 1 FLOOR HOUSE PENDING AVAILABLE SUPPORT TO ASSIST WITH BSC MANAGEMENT  EXTENSIVE TIME SPENT EDUCATING PT ON D/C PLANNING AND DME MANAGEMENT INCLUDING HAVING ASSISTANCE W/BSC MANAGEMENT    PT LIMITED DUE TO WBS, PAIN, FATIGUE, AND DECREASED BALANCE  FROM OT PERSPECTIVE, D/C HOME WITH ADDITIONAL SUPPORT  PT REPORTS SON IS ABLE TO PROVIDE CURRENT LEVEL OF ASSISTANCE  ALL QUESTIONS AND CONCERNS ADDRESSED  NO FURTHER INPATIENT OT NEEDS     Additional Treatment Day 1   Recommendation   OT Discharge Recommendation Return to previous environment with social support   Equipment Recommended Bedside commode   Commode Type Standard   OT - OK to Discharge Yes   AM-PAC Daily Activity Inpatient   Lower Body Dressing 2   Bathing 3   Toileting 3   Upper Body Dressing 3   Grooming 4   Eating 4   Daily Activity Raw Score 19   Daily Activity Standardized Score (Calc for Raw Score >=11) 40 22   AM-PAC Applied Cognition Inpatient   Following a Speech/Presentation 4 Understanding Ordinary Conversation 4   Taking Medications 4   Remembering Where Things Are Placed or Put Away 4   Remembering List of 4-5 Errands 4   Taking Care of Complicated Tasks 4   Applied Cognition Raw Score 24   Applied Cognition Standardized Score 62 21   Modified [place] Scale   Modified Ottawa Scale 3     OT GOALS:    PT WILL % ATTENTIVE DURING EDUCATION TO PROMOTE G CARRY OVER WHEN ENGAGING IN ADLS  PT WILL HAVE G CARRY OVER OF LEARNED COMPENSATORY STRATEGIES, ENERGY CONSERVATION TECHNIQUES, AND WBS TO ENGAGE IN ADLS  PT WILL COMPLETE ADLS AT MOD I LEVEL WHILE SEATED DEMONSTRATING G CARRY OVER OF WBS/COMPENSATORY STRATEGIES         Diomedes Sauceda, OTS

## 2021-02-28 NOTE — PROGRESS NOTES
Subjective: No acute events overnight  No acute distress  Denies numbness or tingling  Wants to go home today    Objective:  A 10 point ROS was performed; negative except as noted above       Lab Results   Component Value Date/Time    WBC 10 48 (H) 02/27/2021 05:32 AM    HGB 13 1 02/27/2021 05:32 AM       Vitals:    02/28/21 0252   BP: 106/65   Pulse: (!) 109   Resp: 17   Temp: 99 4 °F (37 4 °C)   SpO2: 95%     Right lower extremity  Splint C/D/I  Motor intact to EHL/FHL  Sensation intact to exposed toes   Toes warm and well perfused with brisk capillary refill    Assessment: 38 y o  female post op day #1 from Right trimalleolar fracture ORIF     Plan:  NWB RLE  Pain control  DVT ppx: Enoxaparin (Lovenox)  PT/OT  Will continue to assess for acute blood loss anemia  Dispo: Ok for discharge from ortho perspective

## 2021-02-28 NOTE — DISCHARGE SUMMARY
ORTHOPEDICS DISCHARGE SUMMARY   Ivy Mcbride 38 y o  female MRN: [id number]  Unit/Bed#: [facility] 808-01      Attending Physician: Meño Chacon    Admitting diagnosis: right ankle fracture    Discharge diagnosis: s/p ORIF right ankle fracture    Date of admission: 2/26/2021    Date of discharge: 02/28/21    HPI  (refer to H&P HPI)    Hospital Course:    74-year-old female status post fall who complained of right ankle pain inability to bear weight was found to have closed right ankle trimalleolar fracture  She was transferred to [facility] for treatment and fixation  She was [facility]  and underwent ORIF right ankle fracture performed by Dr Meño Chacon  Postop day one pain was controlled clinically she is stable for discharge on Lovenox with nonweightbearing status to the right lower extremity in a splint  Discharge Instructions: The patient was discharged non weight bearing to the right lower extremity in a splint  She is to keep the splint on clean and dry at all times, and is to f/u with Dr Meño Chacon in the clinic in 2 weeks  Discharge Medications:   For the complete list of discharge medications, please refer to the patient's medication   Lovenox for DVT ppx  Oxycodone for PRN pain

## 2021-02-28 NOTE — DISCHARGE INSTRUCTIONS
Discharge Instructions - Orthopedics  Heath Jeronimo 38 y o  female MRN: [id number]  Unit/Bed#: Operating Room    Weight Bearing Status:                                           Non-weight bearing right lower extremity    DVT prophylaxis  Lovenox    Pain:  Continue analgesics as directed    Dressing Instructions:   Please keep clean, dry and intact until follow up     Appt Instructions: If you do not have your appointment, please call the clinic at [phone]   Otherwise followup as scheduled     Contact the office sooner if you experience any increased numbness/tingling in the extremities        Miscellaneous:  None

## 2021-03-01 ENCOUNTER — TELEPHONE (OUTPATIENT)
Dept: OBGYN CLINIC | Facility: OTHER | Age: 36
End: 2021-03-01

## 2021-03-01 LAB
DME PARACHUTE DELIVERY DATE ACTUAL: NORMAL
DME PARACHUTE DELIVERY DATE ACTUAL: NORMAL
DME PARACHUTE DELIVERY DATE REQUESTED: NORMAL
DME PARACHUTE DELIVERY DATE REQUESTED: NORMAL
DME PARACHUTE ITEM DESCRIPTION: NORMAL
DME PARACHUTE ITEM DESCRIPTION: NORMAL
DME PARACHUTE ORDER STATUS: NORMAL
DME PARACHUTE ORDER STATUS: NORMAL
DME PARACHUTE SUPPLIER NAME: NORMAL
DME PARACHUTE SUPPLIER NAME: NORMAL
DME PARACHUTE SUPPLIER PHONE: NORMAL
DME PARACHUTE SUPPLIER PHONE: NORMAL

## 2021-03-01 NOTE — TELEPHONE ENCOUNTER
Thanks Cleveland Rodriguez    I also just spoke with her and advised her on correct dosing for meds    GS

## 2021-03-01 NOTE — TELEPHONE ENCOUNTER
Patient needed to schedule a Post Op with Dr Len López  Scheduled her for 03/15  Issue is she lives out near [facility] , this is a two hour drive to [facility] for her  Is she able to have her first [place] 26 with Dr Len López , then follow up out in [facility]  with Dr Betty Olivera?      C/b # [phone]

## 2021-03-01 NOTE — UTILIZATION REVIEW
Notification of Inpatient Admission/Inpatient Authorization Request   This is a Notification of Inpatient Admission for [address]  Be advised that this patient was admitted to our facility under Inpatient Status  Contact Benito Portillo at [phone] for additional admission information  [facility] DEPT  DEDICATED [phone]  Patient Name:   Steff Karimi   YOB: 1985       [address]   [address]:   [address]  Tax ID: [id number]  NPI: [id number] Attending Provider/NPI:  Phone:  Address: Adelso Connolly Md [[id number]]  [phone]  SAME AS FACILITY   Place of Service Code: 24 Place of Service Name:  [address] [place] [address]   Start Date: 2/26/21 0809     Discharge Date & Time: 2/26/2021  3:37 PM    Type of Admission: Inpatient Status Discharge Disposition (if discharged): Home with [address] [facility] [place]   Patient Diagnoses: Ankle pain [M25 579]  Closed fracture of right ankle, initial encounter [X22 966T]     Orders: Admission Orders (From admission, onward)     Ordered        02/26/21 0809  Inpatient Admission  Once                    Assigned Utilization Review Contact: Benito Portillo  Utilization   Network Utilization Review Department  Phone: [phone]; Fax [phone]  Email: [email or web address]   ATTENTION PAYERS: Please call the assigned Utilization  directly with any questions or concerns ALL voicemails in the department are confidential  Send all requests for admission clinical reviews, approved or denied determinations and any other requests to dedicated fax number belonging to the campus where the patient is receiving treatment

## 2021-03-01 NOTE — TELEPHONE ENCOUNTER
I spoke with patient and she states she talked to Dr Marylen Snell about taking oxycodone every 4 hrs instead of 6 hrs and adding tylenol 1000mg TID  Patient states her cap refill WNL  Advised to elevate toes above the knows, ice 30 min on 20 min off  Call office if above remedies do not decrease pain level  Appt scheduled with Dr Jimmy Marni for PO

## 2021-03-01 NOTE — UTILIZATION REVIEW
Initial Clinical Review  THIS REVIEW IS FOR [facility]  Admission: Date/Time/Statement:   Admission Orders (From admission, onward)     Ordered        02/26/21 1708  INPATIENT ADMISSION  Once                   Orders Placed This Encounter   Procedures    INPATIENT ADMISSION     Standing Status:   Standing     Number of Occurrences:   1     Order Specific Question:   Level of Care     Answer:   Med Surg [16]     Order Specific Question:   Estimated length of stay     Answer:   More than 2 Midnights     Order Specific Question:   Certification     Answer:   I certify that inpatient services are medically necessary for this patient for a duration of greater than two midnights  See H&P and MD Progress Notes for additional information about the patient's course of treatment  Assessment/Plan: 37 yo fem w/ hx anxiety, gerd, mrsa, L ureteral stone transferred from [address] by EMS to [facility] med surg unit, admitted as inpatient due to closed R trimalleolar ankle fx  She initially fell and was unable to bear weight  Pain worse with activity, improved with rest  Decision was made to transfer for higher level of care and orthopedic intervention  On arrival, R ankle swollen, painful rom, pulses intact  Closed reduction performed under sedation and well padded AO splint was applied  Provide analgesics, keep NPO at midnight for OR tomorrow       2/27 to OR @0846:   Procedure(s) (LRB):  OPEN REDUCTION W/ INTERNAL FIXATION (ORIF) ANKLE (Right)  Anesthesia Type: General  Operative Findings:Extensive comminution medial malleolus  Intra-articular posterior lateral fragment  Comminuted distal 3rd fibular shaft fracture    PM Update: IP  sending referrals for home health care    Temperature Pulse Respirations Blood Pressure SpO2   02/26/21 1717 02/26/21 1717 02/26/21 1717 02/26/21 1717 02/26/21 1717   98 °F (36 7 °C) 87 20 118/68 97 %         Pain Score 02/26/21 2100       Worst Possible Pain          Wt Readings from Last 1 Encounters:   02/27/21 110 kg (242 lb 8 1 oz)     Additional Vital Signs:   Date/Time  Temp  Pulse  Resp  BP   SpO2  Calculated FIO2 (%) -  Nasal Cannula O2 Flow Rate (L/min)  O2 Device    02/27/21 15:36:43  99 °F (37 2 °C)  114Abnormal   --  115/69   89 %Abnormal   -- --  --    02/27/21 1339  --  --  --  --   --  28 2 L/min  Nasal cannula    02/27/21 13:33:38  98 1 °F (36 7 °C)  100  20  134/81   94 %  -- --  --    02/27/21 1300  --  90  20  125/73   97 %  28 2 L/min  Nasal cannula    02/27/21 1245  98 7 °F (37 1 °C)  106Abnormal   20  148/74   98 %  28 2 L/min  Nasal cannula    02/27/21 1230  --  94  22  140/84   99 %  -- --  --    02/27/21 1215  --  116Abnormal   22  120/76   100 %  44 6 L/min  Simple mask    02/27/21 1200  --  86  15  96/67   100 %  44 6 L/min  Simple mask    02/27/21 1145  --  80  16  112/69   99 %  44 6 L/min  Simple mask    02/27/21 1138  97 2 °F (36 2 °C)Abnormal   98  20  120/59   96 %  44 6 L/min  Simple mask    02/26/21 21:55:22  98 1 °F (36 7 °C)  95  20  119/52   96 %  -- --  --        Pertinent Labs/Diagnostic Test Results:     Done at [facility] on 2/26:  R ankle: Trimalleolar fractures with dislocation  R ankle post reduction: Interval realignment of trimalleolar fracture  CT RLE Acute trimalleolar fracture     Done at [facility]:   2/26 PCXR: No radiographic evidence of acute intrathoracic process on this examination which is somewhat limited by low lung volumes         No EKG  Results from last 7 days   Lab Units 02/27/21  0532 02/26/21  0952   WBC Thousand/uL 10 48* 10 84*   HEMOGLOBIN g/dL 13 1 14 1   HEMATOCRIT % 41 8 42 7   PLATELETS Thousands/uL 218 248   NEUTROS ABS Thousands/µL 7 23 8 26*         Results from last 7 days   Lab Units 02/27/21  0532 02/26/21  1758 02/26/21  0952   SODIUM mmol/L 135*  --  138   POTASSIUM mmol/L 5 0  --  4 0   CHLORIDE mmol/L 111*  --  105   CO2 mmol/L 18*  --  26   ANION GAP mmol/L 6  --  7   BUN mg/dL 16  --  12   CREATININE mg/dL 0 85  --  0 88   EGFR ml/min/1 73sq m 88  --  85   CALCIUM mg/dL 8 7  --  8 2*   MAGNESIUM mg/dL  --  2 2  --    PHOSPHORUS mg/dL  --  3 5  --      Results from last 7 days   Lab Units 02/26/21  0952   AST U/L 21   ALT U/L 50   ALK PHOS U/L 65   TOTAL PROTEIN g/dL 6 3*   ALBUMIN g/dL 3 1*   TOTAL BILIRUBIN mg/dL 0 69         Results from last 7 days   Lab Units 02/27/21  0532 02/26/21  0952   GLUCOSE RANDOM mg/dL 95 131     Results from last 7 days   Lab Units 02/26/21  0952   PROTIME seconds 13 4   INR  1 04   PTT seconds 25       Past Medical History:   Diagnosis Date    Anxiety     Depression     GERD (gastroesophageal reflux disease)     Left ureteral calculus     MRSA (methicillin resistant staph aureus) culture positive     5-10 yrs ago approx- right inner thigh - had abscess    Vertigo     Wears glasses      Present on Admission:   Closed trimalleolar fracture of right ankle      Admitting Diagnosis: Closed trimalleolar fracture of right ankle [S82 851A]  Age/Sex: 38 y o  female  Admission Orders:  Scheduled Medications:  acetaminophen, 650 mg, Oral, Q6H [facility]  cefazolin, 2,000 mg, Intravenous, Q8H  docusate sodium, 100 mg, Oral, BID  [START ON 2/28/2021] enoxaparin, 40 mg, Subcutaneous, Q12H [facility]  gabapentin, 300 mg, Oral, HS  melatonin, 6 mg, Oral, HS  methocarbamol, 500 mg, Oral, Q6H [facility]  senna-docusate sodium, 2 tablet, Oral, Daily      Continuous IV Infusions:  No current facility-administered medications for this encounter       PRN Meds:  calcium carbonate, 1,000 mg, Oral, Daily PRN  HYDROmorphone, 0 5 mg, Intravenous, Q3H PRN x 2 on 2/27 @0857, 1424  lactated ringers, 500 mL, Intravenous, Once PRN  LORazepam, 0 5 mg, Oral, BID PRN  methocarbamol, 750 mg, Oral, Q6H PRN  ondansetron, 4 mg, Intravenous, Q4H PRN x 1 2/27 @0851  oxyCODONE, 10 mg, Oral, Q4H PRN x 1 2/26 @2121, x 2 2/27 @2306, 1339  oxyCODONE, 5 mg, Oral, Q4H PRN  sodium chloride, 500 mL, Intravenous, Once PRN          No wt bearing RLE, keep elevated  SCD's  NPO adv to house diet      Network Utilization Review Department  ATTENTION: Please call with any questions or concerns to [phone] and carefully listen to the prompts so that you are directed to the right person  All voicemails are confidential   Ish Lymaner all requests for admission clinical reviews, approved or denied determinations and any other requests to dedicated fax number below belonging to the campus where the patient is receiving treatment  List of dedicated fax numbers for the Facilities:  [address] DENIALS (Administrative/Medical Necessity) [phone]   [address] (Maternity/NICU/Pediatrics) [phone]   [address] Jc Gee 6015 (  Roney Vidal "Nia" 103) [address] [address] Sadiq Eduardo 14[address] [place] [address] [phone]       Notification of Discharge  This is a Notification of Discharge from our facility [address]  Please be advised that this patient has been discharge from our facility  Below you will find the admission and discharge date and time including the patients disposition      PRESENTATION DATE: 2/26/2021  5:09 PM  OBS ADMISSION DATE:   IP ADMISSION DATE: 2/26/21 1709   DISCHARGE DATE: 2/28/2021  2:46 PM  DISPOSITION: Home with [facility] with [address] [facility]   Admission Orders listed below:  Admission Orders (From admission, onward)     Ordered 02/26/21 1708  INPATIENT ADMISSION  Once                   Please contact the UR Department if additional information is required to close this patient's authorization/case  [place] Utilization Review Department  Main: [phone] x carefully listen to the prompts  All voicemails are confidential   [email or web address]  Send all requests for admission clinical reviews, approved or denied determinations and any other requests to dedicated fax number below belonging to the campus where the patient is receiving treatment   List of dedicated fax numbers:  [address] DENIALS (Administrative/Medical Necessity) [phone]   [address] (Maternity/NICU/Pediatrics) [phone]   Maryse Hugo [phone]   Melva Batista [phone]   [facility] [phone]   [address] [phone]   [facility]  [phone]   [address] [place] [address], [address]  [address] [facility] [address] [facility] [phone]

## 2021-03-01 NOTE — UTILIZATION REVIEW
Notification of Discharge  This is a Notification of Discharge from our facility [address]  Please be advised that this patient has been discharge from our facility  Below you will find the admission and discharge date and time including the patients disposition  PRESENTATION DATE: 2/26/2021  5:09 PM  OBS ADMISSION DATE:   IP ADMISSION DATE: 2/26/21 1709   DISCHARGE DATE: 2/28/2021  2:46 PM  DISPOSITION: Home with [facility] with 2003 [facility]   Admission Orders listed below:  Admission Orders (From admission, onward)     Ordered        02/26/21 1708  INPATIENT ADMISSION  Once                   Please contact the UR Department if additional information is required to close this patient's authorization/case  [address] Utilization Review Department  Main: [phone] x carefully listen to the prompts  All voicemails are confidential   [email or web address]  Send all requests for admission clinical reviews, approved or denied determinations and any other requests to dedicated fax number below belonging to the campus where the patient is receiving treatment   List of dedicated fax numbers:  [address] DENIALS (Administrative/Medical Necessity) [phone]   1000 [address] [facility] (Maternity/NICU/Pediatrics) [phone] 5400 [facility] [phone]   Gomes Muscat [phone]   Anuradha Jiang [phone]   Antonio Reynolds [place] 1525 [facility] [phone]   1101 [facility] [phone]   2207 [facility]  2401 [facility] 1000 [facility] [phone]

## 2021-03-01 NOTE — TELEPHONE ENCOUNTER
Patient called in in regards to the pain she is still having  She said the OxyCodone & Tylenol as directed  She said the pain is still very bad      C/b #[phone]

## 2021-03-02 NOTE — UTILIZATION REVIEW
Notification of Discharge  This is a Notification of Discharge from our facility [address]  Please be advised that this patient has been discharge from our facility  Below you will find the admission and discharge date and time including the patients disposition  PRESENTATION DATE: 2/26/2021  5:58 AM  OBS ADMISSION DATE:   IP ADMISSION DATE: 2/26/21 0809   DISCHARGE DATE: 2/26/2021  3:37 PM  DISPOSITION: Home with [facility] with 2003 [facility]   Admission Orders listed below:  Admission Orders (From admission, onward)     Ordered        02/26/21 0809  Inpatient Admission  Once                   Please contact the UR Department if additional information is required to close this patient's authorization/case  [address] [employer] Utilization Review Department  Main: [phone] x carefully listen to the prompts  All voicemails are confidential   [email or web address]  Send all requests for admission clinical reviews, approved or denied determinations and any other requests to dedicated fax number below belonging to the campus where the patient is receiving treatment   List of dedicated fax numbers:  [address] DENIALS (Administrative/Medical Necessity) [phone]   [address] (Maternity/NICU/Pediatrics) [phone]   Addy Willams [phone]   True Constant [phone]   Sotero Standing [phone]   [address] [phone]   [facility]  [phone]   2205 [facility] S W  2401 [facility] 1000 W [facility] [phone]

## 2021-03-03 ENCOUNTER — SOCIAL WORK (OUTPATIENT)
Dept: CASE MANAGEMENT | Facility: HOSPITAL | Age: 36
End: 2021-03-03

## 2021-03-03 NOTE — UTILIZATION REVIEW
UPDATED INSURANCE      Notification of Inpatient Admission/Inpatient Authorization Request   This is a Notification of Inpatient Admission for [address]  Be advised that this patient was admitted to our facility under Inpatient Status  Contact Viviana Ceja at [phone] for additional admission information  Chivo Waldron  DEPT  DEDICATED -[phone]  Patient Name:   Alyce Jeronimo   YOB: 1985       [address]   [address]:   [place]  Tax ID: [id number]  NPI: [id number] Attending Provider/NPI:  Phone:  Address: Gigi Cardoza [[id number]]  [phone]  Same as MAJO/Betsy Keane 1106 of Service Code: 24 Place of Service Name:  [address] [place] [address]   Start Date: 2/26/21 1709 Discharge Date & Time: 2/28/2021  2:46 PM    Type of Admission: Inpatient Status Discharge [facility]if discharged[facility]   Patient Diagnoses: Closed trimalleolar fracture of right ankle [S82 851A]     Orders: Admission Orders (From admission, onward)     Ordered        02/26/21 1708  INPATIENT ADMISSION  Once                    Assigned Utilization Review Contact: Viviana Ceja  Utilization ,   Network Utilization Review Department  Phone: [phone]; Fax [phone]   Email: [email or web address]   ATTENTION PAYERS: Please call the assigned Utilization  directly with any questions or concerns ALL voicemails in the department are confidential  Send all requests for admission clinical reviews, approved or denied determinations and any other requests to dedicated fax number belonging to the campus where the patient is receiving treatment          Initial Clinical Review  THIS REVIEW IS FOR [facility]  Admission: Date/Time/Statement:   Admission Orders (From admission, onward)     Ordered        02/26/21 1708  INPATIENT ADMISSION  Once Orders Placed This Encounter   Procedures    INPATIENT ADMISSION     Standing Status:   Standing     Number of Occurrences:   1     Order Specific Question:   Level of Care     Answer:   Med Surg [16]     Order Specific Question:   Estimated length of stay     Answer:   More than 2 Midnights     Order Specific Question:   Certification     Answer:   I certify that inpatient services are medically necessary for this patient for a duration of greater than two midnights  See H&P and MD Progress Notes for additional information about the patient's course of treatment  Assessment/Plan: 37 yo fem w/ hx anxiety, gerd, mrsa, L ureteral stone transferred from [address] [place] [address] med surg unit, admitted as inpatient due to closed R trimalleolar ankle fx  She initially fell and was unable to bear weight  Pain worse with activity, improved with rest  Decision was made to transfer for higher level of care and orthopedic intervention  On arrival, R ankle swollen, painful rom, pulses intact  Closed reduction performed under sedation and well padded AO splint was applied  Provide analgesics, keep NPO at midnight for OR tomorrow       2/27 to OR @0846:   Procedure(s) (LRB):  OPEN REDUCTION W/ INTERNAL FIXATION (ORIF) ANKLE (Right)  Anesthesia Type: General  Operative Findings:Extensive comminution medial malleolus  Intra-articular posterior lateral fragment  Comminuted distal 3rd fibular shaft fracture    PM Update: IP  sending referrals for home health care    Temperature Pulse Respirations Blood Pressure SpO2   02/26/21 1717 02/26/21 1717 02/26/21 1717 02/26/21 1717 02/26/21 1717   98 °F (36 7 °C) 87 20 118/68 97 %         Pain Score       02/26/21 2100       Worst Possible Pain          Wt Readings from Last 1 Encounters:   02/27/21 110 kg (242 lb 8 1 oz)     Additional Vital Signs:   Date/Time  Temp  Pulse  Resp  BP   SpO2  Calculated FIO2 (%) -  Nasal Cannula O2 Flow Rate (L/min)  O2 Device    02/27/21 15:36:43  99 °F (37 2 °C)  114Abnormal   --  115/69   89 %Abnormal   -- --  --    02/27/21 1339  --  --  --  --   --  28 2 L/min  Nasal cannula    02/27/21 13:33:38  98 1 °F (36 7 °C)  100  20  134/81   94 %  -- --  --    02/27/21 1300  --  90  20  125/73   97 %  28 2 L/min  Nasal cannula    02/27/21 1245  98 7 °F (37 1 °C)  106Abnormal   20  148/74   98 %  28 2 L/min  Nasal cannula    02/27/21 1230  --  94  22  140/84   99 %  -- --  --    02/27/21 1215  --  116Abnormal   22  120/76   100 %  44 6 L/min  Simple mask    02/27/21 1200  --  86  15  96/67   100 %  44 6 L/min  Simple mask    02/27/21 1145  --  80  16  112/69   99 %  44 6 L/min  Simple mask    02/27/21 1138  97 2 °F (36 2 °C)Abnormal   98  20  120/59   96 %  44 6 L/min  Simple mask    02/26/21 21:55:22  98 1 °F (36 7 °C)  95  20  119/52   96 %  -- --  --        Pertinent Labs/Diagnostic Test Results:     Done at [facility] on 2/26:  R ankle: Trimalleolar fractures with dislocation  R ankle post reduction: Interval realignment of trimalleolar fracture  CT RLE Acute trimalleolar fracture     Done at [facility]:   2/26 PCXR: No radiographic evidence of acute intrathoracic process on this examination which is somewhat limited by low lung volumes         No EKG  Results from last 7 days   Lab Units 02/27/21  0532 02/26/21  0952   WBC Thousand/uL 10 48* 10 84*   HEMOGLOBIN g/dL 13 1 14 1   HEMATOCRIT % 41 8 42 7   PLATELETS Thousands/uL 218 248   NEUTROS ABS Thousands/µL 7 23 8 26*         Results from last 7 days   Lab Units 02/27/21  0532 02/26/21  1758 02/26/21  0952   SODIUM mmol/L 135*  --  138   POTASSIUM mmol/L 5 0  --  4 0   CHLORIDE mmol/L 111*  --  105   CO2 mmol/L 18*  --  26   ANION GAP mmol/L 6  --  7   BUN mg/dL 16  --  12   CREATININE mg/dL 0 85  --  0 88   EGFR ml/min/1 73sq m 88  --  85   CALCIUM mg/dL 8 7  --  8 2*   MAGNESIUM mg/dL  --  2 2  --    PHOSPHORUS mg/dL  --  3 5  --      Results from last 7 days   Lab Units 02/26/21  0952   AST U/L 21   ALT U/L 50   ALK PHOS U/L 65   TOTAL PROTEIN g/dL 6 3*   ALBUMIN g/dL 3 1*   TOTAL BILIRUBIN mg/dL 0 69         Results from last 7 days   Lab Units 02/27/21  0532 02/26/21  0952   GLUCOSE RANDOM mg/dL 95 131     Results from last 7 days   Lab Units 02/26/21  0952   PROTIME seconds 13 4   INR  1 04   PTT seconds 25       Past Medical History:   Diagnosis Date    Anxiety     Depression     GERD (gastroesophageal reflux disease)     Left ureteral calculus     MRSA (methicillin resistant staph aureus) culture positive     5-10 yrs ago approx- right inner thigh - had abscess    Vertigo     Wears glasses      Present on Admission:   Closed trimalleolar fracture of right ankle      Admitting Diagnosis: Closed trimalleolar fracture of right ankle [S82 851A]  Age/Sex: 38 y o  female  Admission Orders:  Scheduled Medications:  acetaminophen, 650 mg, Oral, Q6H [facility]  cefazolin, 2,000 mg, Intravenous, Q8H  docusate sodium, 100 mg, Oral, BID  [START ON 2/28/2021] enoxaparin, 40 mg, Subcutaneous, Q12H OLIVER  gabapentin, 300 mg, Oral, HS  melatonin, 6 mg, Oral, HS  methocarbamol, 500 mg, Oral, Q6H OLIVER  senna-docusate sodium, 2 tablet, Oral, Daily      Continuous IV Infusions:  No current facility-administered medications for this encounter       PRN Meds:  calcium carbonate, 1,000 mg, Oral, Daily PRN  HYDROmorphone, 0 5 mg, Intravenous, Q3H PRN x 2 on 2/27 @0857, 1424  lactated ringers, 500 mL, Intravenous, Once PRN  LORazepam, 0 5 mg, Oral, BID PRN  methocarbamol, 750 mg, Oral, Q6H PRN  ondansetron, 4 mg, Intravenous, Q4H PRN x 1 2/27 @0851  oxyCODONE, 10 mg, Oral, Q4H PRN x 1 2/26 @2121, x 2 2/27 @0416, 1339  oxyCODONE, 5 mg, Oral, Q4H PRN  sodium chloride, 500 mL, Intravenous, Once PRN          No wt bearing RLE, keep elevated  SCD's  NPO adv to house diet      Network Utilization Review Department  ATTENTION: Please call with any questions or concerns to [phone] and carefully listen to the prompts so that you are directed to the right person  All voicemails are confidential   Lev Presley all requests for admission clinical reviews, approved or denied determinations and any other requests to dedicated fax number below belonging to the campus where the patient is receiving treatment  List of dedicated fax numbers for the Facilities:  [address] DENIALS (Administrative/Medical Necessity) [phone]   [address] (Maternity/NICU/Pediatrics) [phone] 401 [address] Jc Gee [address] ([address] Emila Fieldorfa "[address]ia" [address]) [address] [address] Sadiq Hernández 1481 [address] [place] [address] [phone]         Notification of Discharge  This is a Notification of Discharge from our facility [address]  Please be advised that this patient has been discharge from our facility  Below you will find the admission and discharge date and time including the patients disposition  PRESENTATION DATE: 2/26/2021  5:09 PM  OBS ADMISSION DATE:   IP ADMISSION DATE: 2/26/21 1709   DISCHARGE DATE: 2/28/2021  2:46 PM  DISPOSITION: Home with [facility] with 2003 [facility]   Admission Orders listed below:  Admission Orders (From admission, onward)     Ordered        02/26/21 1708  INPATIENT ADMISSION  Once                   Please contact the UR Department if additional information is required to close this patient's authorization/case      [address] Utilization Review Department  Main: [phone] x carefully listen to the prompts  All voicemails are confidential   [email or web address]  Send all requests for admission clinical reviews, approved or denied determinations and any other requests to dedicated fax number below belonging to the campus where the patient is receiving treatment   List of dedicated fax numbers:  [address] DENIALS (Administrative/Medical Necessity) [phone]   [address] (Maternity/NICU/Pediatrics) [phone]   Danyel Perez [phone]   Mary Lou Jules [phone]   Yehuda King [phone]   Peter [address] [phone]   [facility]  [phone]   [address] [place] [address], S W  2401 [facility] 1000 W [facility] [phone]

## 2021-03-03 NOTE — PROGRESS NOTES
Pt notified, Honey Grissom RN via phone that physical therapy and occupational therapy have not visited pt at home  CM followed in [address] that pt has not been accepted to a VNA  CM called pt at [phone] and asked if CM can send more referrals  Pt agreed  Referrals sent via [address]  CM to follow

## 2021-03-08 ENCOUNTER — TELEPHONE (OUTPATIENT)
Dept: OBGYN CLINIC | Facility: HOSPITAL | Age: 36
End: 2021-03-08

## 2021-03-08 DIAGNOSIS — S82.851A CLOSED TRIMALLEOLAR FRACTURE OF RIGHT ANKLE, INITIAL ENCOUNTER: ICD-10-CM

## 2021-03-08 RX ORDER — NALOXONE HYDROCHLORIDE 4 MG/.1ML
SPRAY NASAL
Qty: 1 EACH | Refills: 1 | Status: SHIPPED | OUTPATIENT
Start: 2021-03-08 | End: 2021-05-03

## 2021-03-08 RX ORDER — OXYCODONE HYDROCHLORIDE 5 MG/1
5 TABLET ORAL EVERY 6 HOURS PRN
Qty: 24 TABLET | Refills: 0 | Status: SHIPPED | OUTPATIENT
Start: 2021-03-08 | End: 2021-05-03

## 2021-03-08 NOTE — TELEPHONE ENCOUNTER
Patient made aware it is a national and practice mandate due to Ativan  Patient verbalized understanding

## 2021-03-08 NOTE — TELEPHONE ENCOUNTER
PO Dr Arnulfo Ng  RE: Dylan Ng called for refill- 1 left      oxyCODONE (ROXICODONE) 5 mg immediate release tablet [[id number]]     Order Details  Dose: 5 mg Route: Oral Frequency: Every 6 hours PRN for severe pain   Dispense Quantity: 30 tablet Refills: 0 Fills remaining         [facility] [place], [place] - [address] [id number]  [phone]

## 2021-03-08 NOTE — TELEPHONE ENCOUNTER
Patient is calling to find out why narcan was also called in because she doesn't have a problem        [phone]

## 2021-03-15 ENCOUNTER — OFFICE VISIT (OUTPATIENT)
Dept: OBGYN CLINIC | Facility: CLINIC | Age: 36
End: 2021-03-15
Payer: COMMERCIAL

## 2021-03-15 ENCOUNTER — HOSPITAL ENCOUNTER (OUTPATIENT)
Dept: RADIOLOGY | Facility: CLINIC | Age: 36
Discharge: HOME | End: 2021-03-15
Payer: COMMERCIAL

## 2021-03-15 VITALS
SYSTOLIC BLOOD PRESSURE: 122 MMHG | WEIGHT: 242 LBS | DIASTOLIC BLOOD PRESSURE: 76 MMHG | HEIGHT: 64 IN | TEMPERATURE: 97.4 F | BODY MASS INDEX: 41.32 KG/M2

## 2021-03-15 DIAGNOSIS — S82.851D CLOSED TRIMALLEOLAR FRACTURE OF RIGHT ANKLE WITH ROUTINE HEALING, SUBSEQUENT ENCOUNTER: Primary | ICD-10-CM

## 2021-03-15 DIAGNOSIS — S82.851D CLOSED TRIMALLEOLAR FRACTURE OF RIGHT ANKLE WITH ROUTINE HEALING, SUBSEQUENT ENCOUNTER: ICD-10-CM

## 2021-03-15 PROCEDURE — 29405 APPL SHORT LEG CAST: CPT | Performed by: PHYSICIAN ASSISTANT

## 2021-03-15 PROCEDURE — 99024 POSTOP FOLLOW-UP VISIT: CPT | Performed by: PHYSICIAN ASSISTANT

## 2021-03-15 PROCEDURE — 73610 X-RAY EXAM OF ANKLE: CPT

## 2021-03-15 RX ORDER — OXYCODONE HYDROCHLORIDE 5 MG/1
5 TABLET ORAL EVERY 6 HOURS PRN
Qty: 15 TABLET | Refills: 0 | Status: SHIPPED | OUTPATIENT
Start: 2021-03-15 | End: 2021-05-03

## 2021-03-15 RX ORDER — IBUPROFEN 600 MG/1
600 TABLET ORAL EVERY 8 HOURS SCHEDULED
Qty: 45 TABLET | Refills: 0 | Status: SHIPPED | OUTPATIENT
Start: 2021-03-15

## 2021-03-15 NOTE — PROGRESS NOTES
Patient Name:  Janak Finney  MRN:  [id number]    Assessment     1  Closed trimalleolar fracture of right ankle with routine healing, subsequent encounter  XR ankle 3+ vw right    Cast application       Plan     Sutures were removed today  XRs performed today in clinic were reviewed with the patient and her mother  The patient will remain NWB LLE  She was transitioned today to a short leg cast  Cast precautions were reviewed with the patient  We will see her back in 4 weeks for recheck  Cast is to be removed upon arrival followed by XRs of the right ankle  She was encouraged to contact the office should questions or concerns arise  Return in about 4 weeks (around 4/12/2021)  Subjective   Janak Finney returns for follow-up of trimalleolar fracture of right ankle  The patient is 2 week(s) post ORIF with Dr Sumanth Lawrence and returns for routine follow-up  She reports she is doing well  Her pain is reasonable  However, she continues to use narcotics on a fairly routine basis  She has been compliant with remaining NWB  She denies numbness/tingling  Denies fevers, chills, malaise  Objective     /76   Temp (!) 97 4 °F (36 3 °C)   Ht 5' 4" (1 626 m)   Wt 110 kg (242 lb)   LMP  (LMP Unknown)   BMI 41 54 kg/m²     The right ankle exam demonstrates the operative splint to be in place upon arrival  This was removed without difficulty  Skin is well approximated with sutures  No erythema, no active drainage  Moderate swelling of the foot and ankle, as to be expected  Calf compartments are soft and nontender  +EHL/FHL  Ankle ROM was deferred secondary to post-operative period  The remainder of the right lower extremity exam is benign  Data Review     I have personally reviewed pertinent films and reports in PACS and my interpretation is as follows:     XRs of the right ankle performed today in clinic demonstrates hardware in appropriate alignment without  Significant interval changed  Cast application    Date/Time: 3/15/2021 11:37 AM  Performed by: Deneen Woods PA-C  Authorized by: Deneen Woods PA-C   Universal Protocol:  Consent: Verbal consent obtained  Risks and benefits: risks, benefits and alternatives were discussed  Consent given by: patient  Patient understanding: patient states understanding of the procedure being performed  Radiology Images displayed and confirmed  If images not available, report reviewed: imaging studies available      Pre-procedure details:     Sensation:  Normal  Procedure details:     Laterality:  Right    Location:  Ankle    Ankle:  R ankleCast type:  Short leg    Supplies:  Cotton padding and fiberglass  Post-procedure details:     Sensation:  Normal    Patient tolerance of procedure:   Tolerated well, no immediate complications              Martha Eugene

## 2021-03-16 ENCOUNTER — TELEPHONE (OUTPATIENT)
Dept: OBGYN CLINIC | Facility: CLINIC | Age: 36
End: 2021-03-16

## 2021-03-16 NOTE — TELEPHONE ENCOUNTER
Sammi Mendieta   #: [phone]           Patient is calling in requesting a call back  She had sx in the R ankle 2/27  She had a cast put on yesterday   She would like to know if she still has to keep her foot elevated with the cast?         Please advise,

## 2021-03-16 NOTE — TELEPHONE ENCOUNTER
Spoke to patient  Advised elevation is for swelling  Advised elevation behind the calf, not under the ankle or heel as this can lead to ulcer with a cast on  Encouraged frequent ambulation with NWB to the extremity to prevent blood clots and elevate when resting  Patient verbalized understanding

## 2021-03-24 ENCOUNTER — TELEPHONE (OUTPATIENT)
Dept: OBGYN CLINIC | Facility: HOSPITAL | Age: 36
End: 2021-03-24

## 2021-03-24 ENCOUNTER — OFFICE VISIT (OUTPATIENT)
Dept: OBGYN CLINIC | Facility: CLINIC | Age: 36
End: 2021-03-24

## 2021-03-24 DIAGNOSIS — S82.851D CLOSED TRIMALLEOLAR FRACTURE OF RIGHT ANKLE WITH ROUTINE HEALING, SUBSEQUENT ENCOUNTER: Primary | ICD-10-CM

## 2021-03-24 PROCEDURE — 99024 POSTOP FOLLOW-UP VISIT: CPT | Performed by: ORTHOPAEDIC SURGERY

## 2021-03-24 NOTE — TELEPHONE ENCOUNTER
Patient sees Dr Tin Lara    Patient called stating her cast has become wet on the back  She would like to know what she should do about it  She states the cast isn't soaking but is damp  She's got a fan trying to dry it off  Patient also wants to know what medication she can take to help ease the pain  She was instructed to stop taking oxycodone and now she has pain throughout the night that keeps her awake  Patient was also instructed not to take ibuprofen while she's taking blood thinners      Call back # [phone]

## 2021-03-24 NOTE — TELEPHONE ENCOUNTER
Spoke to patient  She stated she got her cast wet in the shower today  Spoke to the office for Dr Alayna Arteaga  Patient can make it in for 530 and she has been scheduled  Advised her to let the office know if she cannot make it by 530 and we will reschedule  Patient rushed off the phone to get to the office and COVID screening was not precompleted  Gagan Urbina in the office aware

## 2021-03-24 NOTE — UTILIZATION REVIEW
Pt was admitted and cleared with [employer]  On 03/24/2021, CBC was added to the patient's account  Requesting retro auth review  Notification of Inpatient Admission/Inpatient Authorization Request   This is a Notification of Inpatient Admission for Jc Corbett  Be advised that this patient was admitted to our facility under Inpatient Status  Contact Joss Davidson at [phone] for additional admission information  [facility] [address] [facility] DEPT  DEDICATED -[phone]  Patient Name:   Bro Wiley   YOB: 1985       [address]   [address]:   [address]  Tax ID: [id number]  NPI: [id number] Attending Provider/NPI:  Phone:  Address: Fred Medina Md [[id number]]  [phone]  SAME AS FACILITY   Place of Service Code: 24 Place of Service Name:  [address] [place] [address]   Start Date: 2/26/21 0809     Discharge Date & Time: 2/26/2021  3:37 PM    Type of Admission: Inpatient Status Discharge Disposition (if discharged): Home with 2003 [facility]   Patient Diagnoses: Ankle pain [M25 579]  Closed fracture of right ankle, initial encounter [S85 689H]     Orders: Admission Orders (From admission, onward)     Ordered        02/26/21 0809  Inpatient Admission  Once                    Assigned Utilization Review Contact: Joss Davidson  Utilization   Network Utilization Review Department  Phone: [phone]; Fax [phone]  Email: [email or web address]   ATTENTION PAYERS: Please call the assigned Utilization  directly with any questions or concerns ALL voicemails in the department are confidential  Send all requests for admission clinical reviews, approved or denied determinations and any other requests to dedicated fax number belonging to the campus where the patient is receiving treatment

## 2021-03-24 NOTE — PROGRESS NOTES
Patient Name:  Blanquita Chadwick  MRN:  [id number]    Assessment     1  Closed trimalleolar fracture of right ankle with routine healing, subsequent encounter         Plan     1  I would recommend follow-up as previously scheduled  At this time, her cast did not seem to be very wet  In fact, I was unable to detect any moisture and she indicates that the shower she took occurred only an hour and a half before her arrival   I have encouraged her to avoid showering as she described  She will contact the office if questions or concerns arise  Return for   Follow-up as scheduled  Subjective   Blanquita Chadwick returns for follow-up of  Her distal right tibia and fibular fractures  The patient is 3 1/2 week(s) post  Surgical fixation and returns  Evaluation of her cast   She states she was taking a shower, had a plastic bag wrapped around her cast but found that some moisture had gotten inside the plastic bag and caused her cast to be wet  She contacted the office and was instructed to be seen  Objective     LMP  (LMP Unknown)       Exam demonstrates the cast to be in place and in good condition  I was not able to detect any moisture on the padding including at the distal and proximal extents, including placing my hand to the level of the MCP joints within the top of the cast   There is no skin breakdown or irritation noted    There is no significant swelling and distal capillary refill is normal         Deidra Munguia

## 2021-03-24 NOTE — UTILIZATION REVIEW
Pt was admitted and cleared with [employer] coverage  On 03/24/2021, CBC was added to the account - requesting retro auth review  Notification of Inpatient Admission/Inpatient Authorization Request   This is a Notification of Inpatient Admission for Jc Corbett  Be advised that this patient was admitted to our facility under Inpatient Status  Contact Tegan Lou at [phone] for additional admission information  [facility]  DEDICATED -[phone]  Patient Name:   Lucien Villarreal   YOB: 1985       [address]   [address]:   [address]  Tax ID: [id number]  NPI: [id number] Attending Provider/NPI:  Phone:  Address: [address] [[id number]]  [phone]  SAME AS FACILITY   Place of Service Code: 24 Place of Service Name:  [address] [place] [address]   Start Date: 2/26/21 0809     Discharge Date & Time: 2/26/2021  3:37 PM    Type of Admission: Inpatient Status Discharge Disposition (if discharged): Home with [address] [facility]   Patient Diagnoses: Ankle pain [M25 579]  Closed fracture of right ankle, initial encounter [S89 893O]     Orders: Admission Orders (From admission, onward)     Ordered        02/26/21 0809  Inpatient Admission  Once                    Assigned Utilization Review Contact: Tegan Lou  Utilization   Network Utilization Review Department  Phone: [phone]; Fax [phone]  Email: [email or web address]   ATTENTION PAYERS: Please call the assigned Utilization  directly with any questions or concerns ALL voicemails in the department are confidential  Send all requests for admission clinical reviews, approved or denied determinations and any other requests to dedicated fax number belonging to the campus where the patient is receiving treatment

## 2021-04-12 ENCOUNTER — HOSPITAL ENCOUNTER (OUTPATIENT)
Dept: ULTRASOUND IMAGING | Facility: HOSPITAL | Age: 36
Discharge: HOME | End: 2021-04-12
Attending: UROLOGY
Payer: COMMERCIAL

## 2021-04-12 ENCOUNTER — HOSPITAL ENCOUNTER (OUTPATIENT)
Dept: RADIOLOGY | Facility: CLINIC | Age: 36
Discharge: HOME | End: 2021-04-12
Payer: COMMERCIAL

## 2021-04-12 ENCOUNTER — OFFICE VISIT (OUTPATIENT)
Dept: OBGYN CLINIC | Facility: CLINIC | Age: 36
End: 2021-04-12

## 2021-04-12 VITALS
BODY MASS INDEX: 41.32 KG/M2 | SYSTOLIC BLOOD PRESSURE: 126 MMHG | WEIGHT: 242 LBS | DIASTOLIC BLOOD PRESSURE: 74 MMHG | HEIGHT: 64 IN | TEMPERATURE: 97.9 F | HEART RATE: 88 BPM

## 2021-04-12 DIAGNOSIS — S82.851D CLOSED TRIMALLEOLAR FRACTURE OF RIGHT ANKLE WITH ROUTINE HEALING, SUBSEQUENT ENCOUNTER: Primary | ICD-10-CM

## 2021-04-12 DIAGNOSIS — G89.18 POSTOPERATIVE PAIN: ICD-10-CM

## 2021-04-12 DIAGNOSIS — N20.1 URETEROLITHIASIS: ICD-10-CM

## 2021-04-12 DIAGNOSIS — S82.851D CLOSED TRIMALLEOLAR FRACTURE OF RIGHT ANKLE WITH ROUTINE HEALING, SUBSEQUENT ENCOUNTER: ICD-10-CM

## 2021-04-12 PROCEDURE — 76770 US EXAM ABDO BACK WALL COMP: CPT

## 2021-04-12 PROCEDURE — 99024 POSTOP FOLLOW-UP VISIT: CPT | Performed by: ORTHOPAEDIC SURGERY

## 2021-04-12 PROCEDURE — 73610 X-RAY EXAM OF ANKLE: CPT

## 2021-04-12 NOTE — LETTER
April 12, 2021     Patient: Checo Astudillo   YOB: 1985   Date of Visit: 4/12/2021       To Whom it May Concern:    Cezar Frederick is under my professional care  She was seen in my office on 4/12/2021  She may return to work on 04/19/2021  She must be permitted to wear her walker cast boot and is nonweightbearing on the right lower extremity utilizing her scooter or walker for assistance  She must be permitted sedentary duty only  If you have any questions or concerns, please don't hesitate to call           Sincerely,          Gwenette Ormond        CC: No Recipients

## 2021-04-12 NOTE — PROGRESS NOTES
Patient Name:  Juan David Garcia  MRN:  [id number]    Assessment     1  Closed trimalleolar fracture of right ankle with routine healing, subsequent encounter  XR ankle 3+ vw right    Ambulatory referral to Physical Therapy       Plan     1  I would recommend follow-up in 3 weeks  A prescription was provided to initiate therapy for gentle, passive range of motion  She is to continue nonweightbearing  Therapy is to be initiated as soon as she can get started  I provided her with a note for limited duty at work with significant limitations that must be met if she is allowed to return to work by her employer  I will check her range of motion in 3 weeks  X-rays will be obtained again in about 6 weeks  She does understand that she will need to remain nonweightbearing for an additional  6 weeks, until she is 3 months postop  She also does understand that she may not be able to return to full duty until nearly 6 months postoperatively depending upon how she progresses over the next several weeks  Return in about 3 weeks (around 5/3/2021)  Subjective   Juan David Garcia returns for follow-up of   Her right ankle fracture  The patient is 6 week(s) post  ORIF and returns for routine follow-up  Patient Seems to be doing fairly well in her cast   She has been compliant with nonweightbearing  Objective     /74   Pulse 88   Temp 97 9 °F (36 6 °C) (Temporal)   Ht 5' 4" (1 626 m)   Wt 110 kg (242 lb)   BMI 41 54 kg/m²     The exam demonstrated the cast in place upon arrival   This was removed without difficulty  The Telfa dressings were removed  Both incisions are benign and well-healed  She has mild swelling, no ecchymosis and no skin injury noted  Distal sensation is intact  Good color and capillary refill is noted  Data Review     I have personally reviewed pertinent films in PACS demonstrating the fracture to be stably fixated with evidence of some early healing      Juan Trimble Chiquita Garcia

## 2021-05-03 ENCOUNTER — OFFICE VISIT (OUTPATIENT)
Dept: OBGYN CLINIC | Facility: CLINIC | Age: 36
End: 2021-05-03

## 2021-05-03 VITALS
TEMPERATURE: 97.6 F | HEART RATE: 101 BPM | DIASTOLIC BLOOD PRESSURE: 80 MMHG | HEIGHT: 64 IN | BODY MASS INDEX: 41.32 KG/M2 | SYSTOLIC BLOOD PRESSURE: 110 MMHG | WEIGHT: 242 LBS

## 2021-05-03 DIAGNOSIS — S82.851D CLOSED TRIMALLEOLAR FRACTURE OF RIGHT ANKLE WITH ROUTINE HEALING, SUBSEQUENT ENCOUNTER: Primary | ICD-10-CM

## 2021-05-03 PROCEDURE — 99024 POSTOP FOLLOW-UP VISIT: CPT | Performed by: ORTHOPAEDIC SURGERY

## 2021-05-03 NOTE — PROGRESS NOTES
Patient Name:  Nimo Cunningham  MRN:  [id number]    Assessment     1  Closed trimalleolar fracture of right ankle with routine healing, subsequent encounter         Plan     1  I would recommend follow-up in 3-4 weeks  She is to remain nonweightbearing with the boot in place  Physical therapy is to continue with no changes in activity level  X-rays will be obtained at follow-up including AP, lateral and mortise views of the right ankle  She was encouraged to contact me if any questions or concerns arise in the interim  A note was provided for work  Return for 3-4 weeks  Subjective   Nimo Cunningham returns for follow-up of her right ankle fracture  The patient is approximately 9 week(s) post ORIF and returns for routine follow-up  Patient complains of persistent stiffness about the ankle  She does continue to have some pain  She uses her boot as instructed and has been attending physical therapy  She indicates that physical therapy is requesting indications as to when she can begin to bear weight  Objective     /80   Pulse 101   Temp 97 6 °F (36 4 °C) (Temporal)   Ht 5' 4" (1 626 m)   Wt 110 kg (242 lb)   BMI 41 54 kg/m²     The exam demonstrates the incisions to be well-healed  She can actively dorsiflex to about neutral and I can passively dorsiflex a few degrees more  She can plantar flex 20-30 degrees  She is able to actively invert and vera without significant difficulty  Sensation is intact  Mild swelling remains  Good color and capillary refill is noted  Data Review   There were no physical therapy notes to review today      Gabby Estrada

## 2021-05-03 NOTE — LETTER
May 3, 2021     Patient: Andrea Stahl   YOB: 1985   Date of Visit: 5/3/2021       To Whom it May Concern:    Gayle Rogel is under my professional care  She was seen in my office on 5/3/2021  She may return to work on 05/04/2021  She must be permitted to wear her boot when working, must be permitted to be nonweightbearing  She is permitted only sedetary duty  If you have any questions or concerns, please don't hesitate to call           Sincerely,          [place] Seat        CC: No Recipients

## 2021-05-07 ENCOUNTER — OFFICE VISIT (OUTPATIENT)
Dept: UROLOGY | Facility: CLINIC | Age: 36
End: 2021-05-07
Payer: COMMERCIAL

## 2021-05-07 VITALS — HEART RATE: 88 BPM | SYSTOLIC BLOOD PRESSURE: 112 MMHG | DIASTOLIC BLOOD PRESSURE: 80 MMHG

## 2021-05-07 DIAGNOSIS — N20.0 CALCULUS OF KIDNEY: Primary | ICD-10-CM

## 2021-05-07 PROCEDURE — 99213 OFFICE O/P EST LOW 20 MIN: CPT | Performed by: NURSE PRACTITIONER

## 2021-05-07 NOTE — PROGRESS NOTES
Assessment and plan:       1  nephrolithiasis  - stone analysis 80% calcium oxalate  - AUA dietary recommendations reviewed, handout provided  - encouraged upwards of 64 oz of water /lemon water per day  - us of kidney and bladder 04/12/2021: no hydronephrosis, 5 mm echogenic focus in   the right kidney without shadowing but with twin call artifact  - follow-up in 1 year with ultrasound and KUB prior    2  renal cyst  - Right simple cyst      ANEESH Malcolm    History of Present Illness     Hussein Shin is a 38 y o  Female with a history of nephrolithiasis  She initially presented on 09/03/2020 with an 8 mm left lower ureteral calculus with hydronephrosis  She underwent a cystoscopy ureteroscopy laser lithotripsy and retrograde pyelogram with a stent placement on 09/30/2020  She presents today in follow-up  She denies any urinary symptoms  Denies any flank pain  Review dietary recommendations based on AUA guidelines for calcium oxalate stone  Encouraged water upwards of 64 oz per day  Laboratory     Lab Results   Component Value Date    BUN 16 02/27/2021    CREATININE 0 85 02/27/2021       No components found for: GFR    Lab Results   Component Value Date    CALCIUM 8 7 02/27/2021    K 5 0 02/27/2021    CO2 18 (L) 02/27/2021     (H) 02/27/2021       Lab Results   Component Value Date    WBC 10 48 (H) 02/27/2021    HGB 13 1 02/27/2021    HCT 41 8 02/27/2021    MCV 94 02/27/2021     02/27/2021       No results found for: PSA    No results found for this or any previous visit (from the past 1 hour(s))  @RESULT(URINEMICROSCOPIC)@    @RESULT(URINECULTURE)@    Radiology     RENAL ULTRASOUND 04/12/2021     INDICATION:   N20 1: Calculus of ureter  G89 18: Other acute postprocedural pain      COMPARISON: CT from 8/26/2020     TECHNIQUE:   Ultrasound of the retroperitoneum was performed with a curvilinear transducer utilizing volumetric sweeps and still imaging techniques     FINDINGS:     KIDNEYS:  Symmetric and normal size  Right kidney:  11 8 cm  Left kidney:  12 2 cm      Right kidney  Normal echogenicity and contour  No suspicious masses detected  Simple cyst measures 1 6 cm in the right kidney  No hydronephrosis  Echogenic focus in the right kidney measures 5 mm with twinkle artifact but no significant shadowing  Some calculi were noted on prior CT in August 2020 in both kidneys  No perinephric fluid collections      Left kidney  Normal echogenicity and contour  No suspicious masses detected  No hydronephrosis  No shadowing calculi  No perinephric fluid collections      URETERS:  Nonvisualized      BLADDER:   Normally distended  No focal thickening or mass lesions  Bilateral ureteral jets detected         IMPRESSION:     No hydronephrosis  5 mm echogenic focus in the right kidney without shadowing but with twinkle artifact  This probably represents a calculus given the presence of calculi in the prior CT  Right renal cyst  Review of Systems     Review of Systems   Constitutional: Negative for activity change, appetite change, chills, fatigue, fever and unexpected weight change  HENT: Negative for facial swelling  Eyes: Negative for discharge  Respiratory: Negative  Negative for cough and shortness of breath  Cardiovascular: Negative for chest pain and leg swelling  Gastrointestinal: Negative  Negative for abdominal distention, abdominal pain, constipation, diarrhea, nausea and vomiting  Endocrine: Negative  Genitourinary: Negative  Negative for decreased urine volume, difficulty urinating, dysuria, enuresis, flank pain, frequency, genital sores, hematuria and urgency  Musculoskeletal: Positive for gait problem  Negative for back pain and myalgias  Right ankle fracture status post fall   Skin: Negative for pallor and rash  Allergic/Immunologic: Negative  Negative for immunocompromised state     Neurological: Negative for facial asymmetry and speech difficulty  Psychiatric/Behavioral: Negative for agitation and confusion  Allergies     Allergies   Allergen Reactions    Ciprofloxacin Other (See Comments)     Swelling behind right ear    Pollen Extract        Physical Exam     Physical Exam  Vitals signs reviewed  Constitutional:       General: She is not in acute distress  Appearance: Normal appearance  She is obese  She is not ill-appearing, toxic-appearing or diaphoretic  HENT:      Head: Normocephalic and atraumatic  Eyes:      General: No scleral icterus  Right eye: No discharge  Left eye: No discharge  Neck:      Musculoskeletal: Normal range of motion  Cardiovascular:      Rate and Rhythm: Normal rate  Pulmonary:      Effort: Pulmonary effort is normal  No respiratory distress  Abdominal:      General: Abdomen is flat  There is no distension  Palpations: Abdomen is soft  Tenderness: There is no abdominal tenderness  There is no right CVA tenderness, left CVA tenderness, guarding or rebound  Musculoskeletal:         General: Signs of injury present  No swelling  Comments: Cam walker to right lower extremity   Skin:     General: Skin is warm and dry  Coloration: Skin is not jaundiced or pale  Findings: No rash  Neurological:      General: No focal deficit present  Mental Status: She is alert and oriented to person, place, and time  Gait: Gait normal    Psychiatric:         Mood and Affect: Mood normal          Behavior: Behavior normal          Thought Content:  Thought content normal          Judgment: Judgment normal          Vital Signs     Vitals:    05/07/21 1429   BP: 112/80   Pulse: 88       Current Medications       Current Outpatient Medications:     ibuprofen (MOTRIN) 600 mg tablet, Take 1 tablet (600 mg total) by mouth every 8 (eight) hours, Disp: 45 tablet, Rfl: 0    LORazepam (ATIVAN) 0 5 mg tablet, Take 0 5 mg by mouth 2 (two) times a day as needed , Disp: , Rfl:     Melatonin 10 MG CAPS, Take 10 mg by mouth daily at bedtime , Disp: , Rfl:     Active Problems     Patient Active Problem List   Diagnosis    Anxiety    Depression    GERD (gastroesophageal reflux disease)    Left ureteral calculus    Vertigo    MRSA (methicillin resistant staph aureus) culture positive    Morbid obesity due to excess calories ([address] )    Closed trimalleolar fracture of right ankle    Prediabetes       Past Medical History     Past Medical History:   Diagnosis Date    Anxiety     Depression     GERD (gastroesophageal reflux disease)     Left ureteral calculus     MRSA (methicillin resistant staph aureus) culture positive     5-10 yrs ago approx- right inner thigh - had abscess    Vertigo     Wears glasses        Surgical History     Past Surgical History:   Procedure Laterality Date    ABCESS DRAINAGE      ORIF TIBIA & FIBULA FRACTURES Right 2/27/2021    Procedure: OPEN REDUCTION W/ INTERNAL FIXATION (ORIF) ANKLE;  Surgeon: Pablo Waters MD;  [facility];  Service: Orthopedics    SD CYSTO/URETERO W/LITHOTRIPSY &INDWELL STENT INSRT Left 9/30/2020    Procedure: CYSTO, URETEROSCOPY W/HOLMIUM LASER, , STENT, STONE BASKET EXTRACTION;  Surgeon: Jamaica Rascon MD;  Location[facility];  Service: Urology    TREATMENT FISTULA ANAL      WISDOM TOOTH EXTRACTION         Family History     History reviewed  No pertinent family history      Social History     Social History     Social History     Tobacco Use   Smoking Status Never Smoker   Smokeless Tobacco Never Used       Past Surgical History:   Procedure Laterality Date    ABCESS DRAINAGE      ORIF TIBIA & FIBULA FRACTURES Right 2/27/2021    Procedure: OPEN REDUCTION W/ INTERNAL FIXATION (ORIF) ANKLE;  Surgeon: Pablo Waters MD;  [facility];  Service: Orthopedics    SD CYSTO/URETERO W/LITHOTRIPSY &INDWELL STENT INSRT Left 9/30/2020    Procedure: CYSTO, URETEROSCOPY W/HOLMIUM LASER, , STENT, STONE BASKET EXTRACTION;  Surgeon: Rtia Torres MD;  [facility];  Service: Urology    TREATMENT FISTULA ANAL      WISDOM TOOTH EXTRACTION           The following portions of the patient's history were reviewed and updated as appropriate: allergies, current medications, past family history, past medical history, past social history, past surgical history and problem list    Please note :  Voice dictation software has been used to create this document  There may be inadvertent transcription errors      [id number] [address]

## 2021-05-07 NOTE — PATIENT INSTRUCTIONS
Dietary Management of Kidney Stone Disease    The dietary recommendations for most people who make kidney stones (especially the most common calcium oxalate stones) are uncomplicated and are not too tedious or bland  Most importantly, the following recommendations also promote better health for a variety of reasons  FLUIDS:  The single most important change for the majority patients is the need to greatly increase fluid intake  You should at least produce two liters (about two quarts) of urine each day  Depending on the heat outdoors and your level of physical activity, this usually means consuming ten, 10 ounce glasses (100 ounces) of fluid per day  Water is always a good choice, but other drinks including tea, coffee, soda, and juice are also allowed as long as no one beverage becomes the sole source of fluid  CALCIUM:  There is excellent evidence that calcium should not be avoided, but instead moderated  A range of 600 to 1,100 mg of calcium per day, especially consumed at meals is probably a reasonable target  (i e  2-3 dairy servings per day) This might include small servings of yogurt, milk or ice cream   This amount helps avoid over-absorption of oxalate from the digestive tract and also allows for healthy bone maintenance  SODIUM (SALT): Too much salt in your diet (both from the shaker and in the prepared foods that we buy) is bad for your blood pressure, bad for your heart, and also increases the amount of calcium in your urine  A reasonable sodium restriction to 2,000-2,500mg/day (about the amount in one teaspoon) is an excellent target  You should get into the habit of reading the Nutrients labels on all the foods that you eat and watch out for the foods that have a high sodium content (snack foods, smoked or processed foods, caned foods)  Fresh and frozen foods usually have the least amount of sodium      PROTEIN:  High protein diets from animal meat (beef, chicken, pork, fish) also increases the rate of kidney stone formation and is equally unhealthy for your heart  All patients should moderate their meat intake to 3-7 ounces per day, and particularly stay away from red meat protein  OXALATE:  Most stone-formers should avoid heavy intake of oxalate-rich foods  These include green roughage (spinach, mustard, kale), strawberries, chocolate, tea, iced tea, and nuts  In addition, heavy, excess doses of Vitamin C can also produce surges in urinary oxalate levels and should be avoided  ** THESE FOODS ARE HIGH IN OXALATE - TRY TO LIMIT HOW MUCH OF THESE YOU EAT:  Coke and Pepsi  Nuts  Dark Leafy Greens:     Spinach and Kale, Rhubarb, Chard  Asparagus  Beets  Sweet potatoes   Blueberries, Strawberries   Dark tea (Green tea is okay)  Tofu      DRINK 3 QUARTS (96 Oz ) LIQUIDS EACH DAY - ALL LIQUIDS COUNT        ADD LEMON JUICE 3 OZ  DAILY - Fresh squeezed or lemon juice concentrate - Not MinuteMaid, Thai  [place] ([facility]) Hill, Crystal Lite, etc         ** Recipe for BOB'S OLDAMINTA TYME LEMONADE:         One ounce of lemon juice, glass of water, sweetener of your choice    Coffee is okay! Cranberry juice is good to prevent infections, but does not help for stones  BARE-BONES RECOMMENDATIONS:  1  Fluids, fluids, fluids  2  Low salt diet (your primary care doctor will love you)  3  Moderate red meat intake  4  Moderate calcium (dairy products), especially with meals

## 2021-05-11 ENCOUNTER — TELEPHONE (OUTPATIENT)
Dept: OBGYN CLINIC | Facility: HOSPITAL | Age: 36
End: 2021-05-11

## 2021-05-11 NOTE — TELEPHONE ENCOUNTER
Dr Kendall Gunn    Patient is asking how long she will have work restrictions due to her boot? She needs a letter for work stating when she can stop being sedentary and how long the boot has to be worn  Patient can get the note out of [address]        # [phone]

## 2021-06-07 ENCOUNTER — HOSPITAL ENCOUNTER (OUTPATIENT)
Dept: RADIOLOGY | Facility: CLINIC | Age: 36
Discharge: HOME | End: 2021-06-07
Payer: COMMERCIAL

## 2021-06-07 ENCOUNTER — OFFICE VISIT (OUTPATIENT)
Dept: OBGYN CLINIC | Facility: CLINIC | Age: 36
End: 2021-06-07
Payer: COMMERCIAL

## 2021-06-07 VITALS
SYSTOLIC BLOOD PRESSURE: 118 MMHG | BODY MASS INDEX: 41.32 KG/M2 | HEIGHT: 64 IN | TEMPERATURE: 97.6 F | DIASTOLIC BLOOD PRESSURE: 74 MMHG | WEIGHT: 242 LBS | HEART RATE: 76 BPM

## 2021-06-07 DIAGNOSIS — S82.851G CLOSED TRIMALLEOLAR FRACTURE OF RIGHT ANKLE WITH DELAYED HEALING, SUBSEQUENT ENCOUNTER: Primary | ICD-10-CM

## 2021-06-07 DIAGNOSIS — S82.851D CLOSED TRIMALLEOLAR FRACTURE OF RIGHT ANKLE WITH ROUTINE HEALING, SUBSEQUENT ENCOUNTER: ICD-10-CM

## 2021-06-07 PROCEDURE — 73610 X-RAY EXAM OF ANKLE: CPT

## 2021-06-07 PROCEDURE — 99213 OFFICE O/P EST LOW 20 MIN: CPT | Performed by: ORTHOPAEDIC SURGERY

## 2021-06-07 NOTE — PROGRESS NOTES
ASSESSMENT/PLAN:    Diagnoses and all orders for this visit:    Closed trimalleolar fracture of right ankle with delayed healing, subsequent encounter  -     XR ankle 3+ vw right; Future  -     Ambulatory referral to Physical Therapy; Future    Other orders  -     vitamin E 01080 units external oil; Apply topically daily  -     NON FORMULARY; CBD lotion        Edie may begin to weight bear as tolerated in the cast boot  She may remove the boot for sleeping, bathing, and when resting  She was provided an updated referral to PT reflecting the above changes in weight bearing status  We will see her back in 4-6 weeks for recheck  XRs of the right ankle will be obtained upon arrival  She was encouraged to contact the office should questions or concerns arise  Return in 4-6 weeks  Scribe Attestation    I,:  Mari Lee PA-C am acting as a scribe while in the presence of the attending physician :       I,:  Blossom Alvares personally performed the services described in this documentation    as scribed in my presence :             _____________________________________________________  CHIEF COMPLAINT:  Chief Complaint   Patient presents with    Right Ankle - Post-op         SUBJECTIVE:  Primo Deras is a 38 y o  female who presents for follow-up of right trimalleolar ankle fracture  She is 14 weeks status post ORIF performed by Dr Claire Roper  She reports overall, she is doing well  She continues to complain of medial-sided aching with increased activity  She denies numbness or tingling  She denies fevers, chills, malaise      PAST MEDICAL HISTORY:  Past Medical History:   Diagnosis Date    Anxiety     Depression     GERD (gastroesophageal reflux disease)     Left ureteral calculus     MRSA (methicillin resistant staph aureus) culture positive     5-10 yrs ago approx- right inner thigh - had abscess    Vertigo     Wears glasses        PAST SURGICAL HISTORY:  Past Surgical History: Procedure Laterality Date    ABCESS DRAINAGE      ORIF TIBIA & FIBULA FRACTURES Right 2/27/2021    Procedure: OPEN REDUCTION W/ INTERNAL FIXATION (ORIF) ANKLE;  Surgeon: Rico Black MD;  Location:  MAIN OR;  Service: Orthopedics    MI CYSTO/URETERO W/LITHOTRIPSY &INDWELL STENT INSRT Left 9/30/2020    Procedure: CYSTO, URETEROSCOPY W/HOLMIUM LASER, , STENT, STONE BASKET EXTRACTION;  Surgeon: Avani Petty MD;  [facility];  Service: Urology    TREATMENT FISTULA ANAL      WISDOM TOOTH EXTRACTION         FAMILY HISTORY:  History reviewed  No pertinent family history  SOCIAL HISTORY:  Social History     Tobacco Use    Smoking status: Never Smoker    Smokeless tobacco: Never Used   Substance Use Topics    Alcohol use: Yes     Frequency: Monthly or less     Drinks per session: 1 or 2     Binge frequency: Never     Comment: liquor    Drug use: Never       MEDICATIONS:    Current Outpatient Medications:     ibuprofen (MOTRIN) 600 mg tablet, Take 1 tablet (600 mg total) by mouth every 8 (eight) hours, Disp: 45 tablet, Rfl: 0    LORazepam (ATIVAN) 0 5 mg tablet, Take 0 5 mg by mouth 2 (two) times a day as needed , Disp: , Rfl:     Melatonin 10 MG CAPS, Take 10 mg by mouth daily at bedtime , Disp: , Rfl:     NON FORMULARY, CBD lotion, Disp: , Rfl:     vitamin E 25279 units external oil, Apply topically daily, Disp: , Rfl:     ALLERGIES:  Allergies   Allergen Reactions    Ciprofloxacin Other (See Comments)     Swelling behind right ear    Pollen Extract        Review of systems:   Constitutional: Negative for fatigue, fever or loss of apetite  HENT: Negative  Respiratory: Negative for shortness of breath, dyspnea  Cardiovascular: Negative for chest pain/tightness  Gastrointestinal: Negative for abdominal pain, N/V  Endocrine: Negative for cold/heat intolerance, unexplained weight loss/gain  Genitourinary: Negative for flank pain, dysuria, hematuria     Musculoskeletal:  positive as stated in the HPI  Skin: Negative for rash  Neurological: negative for numbness and tingling  Psychiatric/Behavioral: Negative for agitation  _____________________________________________________  PHYSICAL EXAMINATION:    Blood pressure 118/74, pulse 76, temperature 97 6 °F (36 4 °C), temperature source Temporal, height 5' 4" (1 626 m), weight 110 kg (242 lb), not currently breastfeeding  General: well developed and well nourished, alert, oriented times 3 and appears comfortable  Psychiatric: Normal  HEENT: Benign  Cardiovascular: Regular    Pulmonary: No wheezing or stridor  Abdomen: Soft, Nontender  Skin: No masses, erythema, lacerations, fluctation, ulcerations  Neurovascular: Motor and sensory exams are grossly intact  Distal pulses are palpable  Limb is warm and well perfused with good color and capillary refill  MUSCULOSKELETAL EXAMINATION:      The right ankle exam demonstrates skin intact, no erythema, no ecchymosis,   Mild residual swelling  The incisions are well healed and benign  She has mild limitations with ankle range of motion  As to be expected post injury  She has mild tenderness to palpation over the medial malleolus  Calf compartments are soft and compressible without pain  The remainder of the right lower extremity exam is benign  _____________________________________________________  STUDIES REVIEWED:  I have personally reviewed pertinent films and reports in PACS  XRs of the right ankle performed today in clinic demonstrates continued, but slow, progression of healing without significant interval changes in alignment  Early talar osteopenia is noted  PROCEDURES PERFORMED:  Procedures  None performed today      Arcelia Vanessa PA-C

## 2021-06-07 NOTE — PATIENT INSTRUCTIONS
Begin weight bearing with the boot in place  You can put as much weight down as you tolerate  You may remove the boot when sleeping at night but if you get up to go to the bathroom in the middle of the night, the boot needs to be in place

## 2021-06-17 ENCOUNTER — TELEPHONE (OUTPATIENT)
Dept: OBGYN CLINIC | Facility: HOSPITAL | Age: 36
End: 2021-06-17

## 2021-06-17 NOTE — TELEPHONE ENCOUNTER
Spoke to patient  She stated she has a lot of pain when walking and it is very painful  Stated she sat on the couch and wanted to fall asleep because of the pain  Patient stated the tylenol she took did not help her pain  Stated she did not take any motrin or aleve  Stated she needs something for pain  Advised her to try motrin or aleve as recommended  Denies redness, heat to touch  Stated it is swelling  Encouraged continued ice/elevation as advised above for the evening  Verbalized understanding

## 2021-06-17 NOTE — TELEPHONE ENCOUNTER
Patient is calling to relay a message to Dr Veronika Jansen  She's allowed to walk on her ankle now with the boot  She's alternating pain medicine and icing it  Her right ankle hurts  She also takes tylenol  She doesn't know what else she can do  It's the first week of her walking, so she understands it's hard, but she feels like it hurts so much that she wants to pass out  Today she had PT, and after PT she used crutches and she's in pain because her therapist said she should walk in crutches  Please advise      Callback VI#[phone]

## 2021-06-18 ENCOUNTER — OFFICE VISIT (OUTPATIENT)
Dept: OBGYN CLINIC | Facility: CLINIC | Age: 36
End: 2021-06-18
Payer: COMMERCIAL

## 2021-06-18 VITALS
BODY MASS INDEX: 41.07 KG/M2 | WEIGHT: 240.6 LBS | SYSTOLIC BLOOD PRESSURE: 118 MMHG | HEART RATE: 104 BPM | HEIGHT: 64 IN | TEMPERATURE: 97.6 F | DIASTOLIC BLOOD PRESSURE: 74 MMHG

## 2021-06-18 DIAGNOSIS — S82.851G CLOSED TRIMALLEOLAR FRACTURE OF RIGHT ANKLE WITH DELAYED HEALING, SUBSEQUENT ENCOUNTER: Primary | ICD-10-CM

## 2021-06-18 PROCEDURE — 99213 OFFICE O/P EST LOW 20 MIN: CPT | Performed by: ORTHOPAEDIC SURGERY

## 2021-06-18 NOTE — PROGRESS NOTES
ASSESSMENT/PLAN:    Diagnoses and all orders for this visit:    Closed trimalleolar fracture of right ankle with delayed healing, subsequent encounter        Plan:  I would recommend that Edie should continue the current treatment plan with physical therapy, increasing ambulation, weight bearing as tolerated  I have reassured her that her symptoms are not unusual   I would recommend over-the-counter analgesics and not recommend any narcotic analgesics  Return for 2-4 weeks  _____________________________________________________  CHIEF COMPLAINT:  Chief Complaint   Patient presents with    Follow-up     increased pain         SUBJECTIVE:  Lily Lei is a 38y o  year old female who presents for follow up of her right ankle fracture  She is now about 4 months status post open reduction internal fixation  She presents today with concerns regarding the pain and stiffness she is experiencing while ambulating  She is also noting persistent swelling  She notes that physical therapy does not necessarily exacerbate her symptoms but still most certainly standing and walking does        PAST MEDICAL HISTORY:  Past Medical History:   Diagnosis Date    Anxiety     Depression     GERD (gastroesophageal reflux disease)     Left ureteral calculus     MRSA (methicillin resistant staph aureus) culture positive     5-10 yrs ago approx- right inner thigh - had abscess    Vertigo     Wears glasses        PAST SURGICAL HISTORY:  Past Surgical History:   Procedure Laterality Date    ABCESS DRAINAGE      ORIF TIBIA & FIBULA FRACTURES Right 2/27/2021    Procedure: OPEN REDUCTION W/ INTERNAL FIXATION (ORIF) ANKLE;  Surgeon: Jaylon Tao MD;  Location:  MAIN OR;  Service: Orthopedics    CO CYSTO/URETERO W/LITHOTRIPSY &INDWELL STENT INSRT Left 9/30/2020    Procedure: CYSTO, URETEROSCOPY W/HOLMIUM LASER, , STENT, STONE BASKET EXTRACTION;  Surgeon: Betty Bullock MD;  [facility];  Service: Urology  TREATMENT FISTULA ANAL      WISDOM TOOTH EXTRACTION         FAMILY HISTORY:  History reviewed  No pertinent family history  SOCIAL HISTORY:  Social History     Tobacco Use    Smoking status: Never Smoker    Smokeless tobacco: Never Used   Vaping Use    Vaping Use: Never used   Substance Use Topics    Alcohol use: Yes     Comment: liquor    Drug use: Never       MEDICATIONS:    Current Outpatient Medications:     ibuprofen (MOTRIN) 600 mg tablet, Take 1 tablet (600 mg total) by mouth every 8 (eight) hours, Disp: 45 tablet, Rfl: 0    LORazepam (ATIVAN) 0 5 mg tablet, Take 0 5 mg by mouth 2 (two) times a day as needed , Disp: , Rfl:     Melatonin 10 MG CAPS, Take 10 mg by mouth daily at bedtime , Disp: , Rfl:     NON FORMULARY, CBD lotion, Disp: , Rfl:     vitamin E 48993 units external oil, Apply topically daily, Disp: , Rfl:     ALLERGIES:  Allergies   Allergen Reactions    Ciprofloxacin Other (See Comments)     Swelling behind right ear    Pollen Extract        REVIEW OF SYSTEMS:  Pertinent items are noted in HPI  A comprehensive review of systems was negative       _____________________________________________________  PHYSICAL EXAMINATION:  General: alert, oriented times 3 and appears comfortable  Psychiatric: Normal  HEENT:  Normocephalic, atraumatic  Cardiovascular:  Regular  Pulmonary: No wheezing or stridor  Skin: No masses, erthema, lacerations, fluctation, ulcerations  Neurovascular: Motor and sensory exams are grossly intact and pulses palpable  MUSCULOSKELETAL EXAMINATION:    The right ankle exam demonstrates the incision to be well-healed  She does have some tenderness over the distal aspect of the medial incision  There is lesser tenderness laterally  She has limited range of motion and ankle dorsiflexion, plantar flexion, inversion and eversion  Swelling is persistent and there is some pretibial edema noted as well  Calf compartments are soft and nontender    She does demonstrate good strength of resisted range of motion of the ankle and foot            Merle Anger

## 2021-06-18 NOTE — TELEPHONE ENCOUNTER
Spoke to patient this morning  She stated sleeping helped her pain  Reports that she is still having swelling and pain but not as bad  Offered her appointment for today or Monday and she stated she would call back to make an appointment after talking to someone for her ride  Please schedule when call returned

## 2021-06-28 ENCOUNTER — TELEPHONE (OUTPATIENT)
Dept: OBGYN CLINIC | Facility: OTHER | Age: 36
End: 2021-06-28

## 2021-06-28 DIAGNOSIS — S82.851G CLOSED TRIMALLEOLAR FRACTURE OF RIGHT ANKLE WITH DELAYED HEALING, SUBSEQUENT ENCOUNTER: Primary | ICD-10-CM

## 2021-06-28 RX ORDER — ONDANSETRON 4 MG/1
4 TABLET, FILM COATED ORAL EVERY 6 HOURS PRN
Qty: 10 TABLET | Refills: 0 | Status: SHIPPED | OUTPATIENT
Start: 2021-06-28

## 2021-06-28 NOTE — TELEPHONE ENCOUNTER
Patient said she had a missed call from Dr Roxanne Emmanuel  Can you please call patient back?     C/b # [phone]    Thank you

## 2021-07-15 ENCOUNTER — HOSPITAL ENCOUNTER (OUTPATIENT)
Dept: RADIOLOGY | Facility: CLINIC | Age: 36
Discharge: HOME | End: 2021-07-15
Payer: COMMERCIAL

## 2021-07-15 ENCOUNTER — OFFICE VISIT (OUTPATIENT)
Dept: OBGYN CLINIC | Facility: CLINIC | Age: 36
End: 2021-07-15
Payer: COMMERCIAL

## 2021-07-15 VITALS
SYSTOLIC BLOOD PRESSURE: 104 MMHG | DIASTOLIC BLOOD PRESSURE: 68 MMHG | HEIGHT: 64 IN | WEIGHT: 237 LBS | HEART RATE: 97 BPM | BODY MASS INDEX: 40.46 KG/M2

## 2021-07-15 DIAGNOSIS — S82.851G CLOSED TRIMALLEOLAR FRACTURE OF RIGHT ANKLE WITH DELAYED HEALING, SUBSEQUENT ENCOUNTER: Primary | ICD-10-CM

## 2021-07-15 DIAGNOSIS — S82.851G CLOSED TRIMALLEOLAR FRACTURE OF RIGHT ANKLE WITH DELAYED HEALING, SUBSEQUENT ENCOUNTER: ICD-10-CM

## 2021-07-15 PROCEDURE — 73610 X-RAY EXAM OF ANKLE: CPT

## 2021-07-15 PROCEDURE — 99213 OFFICE O/P EST LOW 20 MIN: CPT | Performed by: ORTHOPAEDIC SURGERY

## 2021-07-15 NOTE — PROGRESS NOTES
ASSESSMENT/PLAN:    Diagnoses and all orders for this visit:    Closed trimalleolar fracture of right ankle with delayed healing, subsequent encounter  -     XR ankle 3+ vw right; Future  -     Ankle Cude ankle/Ankle Brace  -     Ambulatory referral to Physical Therapy; Future        XRs performed today in clinic were reviewed with the patient  She can transition from the boot to a brace  This was fitted today for her  She should wear the brace during periods of activity but can be removed for sleeping, bathing, and ROM exercises  She was provided a new referral to PT to begin progressing her activity level  We will see her back in 6 weeks for recheck  XRs of the ankle will be obtained upon arrival      Return in about 6 weeks (around 8/26/2021)  _____________________________________________________  CHIEF COMPLAINT:  Chief Complaint   Patient presents with    Right Ankle - Follow-up, Pain    Right Knee - Pain    Follow-up         SUBJECTIVE:  Sandra Melara is a 38 y o  female who presents for follow up   Of right ankle fracture  She is nearly 4 and half months status post ORIF right ankle performed by Dr Aureliano Jewell  She reports she is doing well  She continues to use the crutches for support        PAST MEDICAL HISTORY:  Past Medical History:   Diagnosis Date    Anxiety     Depression     GERD (gastroesophageal reflux disease)     Left ureteral calculus     MRSA (methicillin resistant staph aureus) culture positive     5-10 yrs ago approx- right inner thigh - had abscess    Vertigo     Wears glasses        PAST SURGICAL HISTORY:  Past Surgical History:   Procedure Laterality Date    ABCESS DRAINAGE      ORIF TIBIA & FIBULA FRACTURES Right 2/27/2021    Procedure: OPEN REDUCTION W/ INTERNAL FIXATION (ORIF) ANKLE;  Surgeon: Wood Odonnell MD;  [facility];  Service: Orthopedics    SD CYSTO/URETERO W/LITHOTRIPSY &INDWELL STENT INSRT Left 9/30/2020    Procedure: CYSTO, URETEROSCOPY W/HOLMIUM LASER, , STENT, STONE BASKET EXTRACTION;  Surgeon: Garrick Martinez MD;  [facility];  Service: Urology    TREATMENT FISTULA ANAL      WISDOM TOOTH EXTRACTION         FAMILY HISTORY:  History reviewed  No pertinent family history  SOCIAL HISTORY:  Social History     Tobacco Use    Smoking status: Never Smoker    Smokeless tobacco: Never Used   Vaping Use    Vaping Use: Never used   Substance Use Topics    Alcohol use: Yes     Comment: liquor    Drug use: Never       MEDICATIONS:    Current Outpatient Medications:     ibuprofen (MOTRIN) 600 mg tablet, Take 1 tablet (600 mg total) by mouth every 8 (eight) hours, Disp: 45 tablet, Rfl: 0    LORazepam (ATIVAN) 0 5 mg tablet, Take 0 5 mg by mouth 2 (two) times a day as needed , Disp: , Rfl:     Melatonin 10 MG CAPS, Take 10 mg by mouth daily at bedtime , Disp: , Rfl:     ondansetron (ZOFRAN) 4 mg tablet, Take 1 tablet (4 mg total) by mouth every 6 (six) hours as needed for nausea or vomiting, Disp: 10 tablet, Rfl: 0    vitamin E 77510 units external oil, Apply topically daily, Disp: , Rfl:     NON FORMULARY, CBD lotion (Patient not taking: Reported on 7/15/2021), Disp: , Rfl:     ALLERGIES:  Allergies   Allergen Reactions    Ciprofloxacin Other (See Comments)     Swelling behind right ear    Pollen Extract        REVIEW OF SYSTEMS:  Pertinent items are noted in HPI  A comprehensive review of systems was negative       _____________________________________________________  PHYSICAL EXAMINATION:  General: well developed and well nourished, alert, oriented times 3 and appears comfortable  Psychiatric: Normal  HEENT:  Normocephalic, atraumatic  Cardiovascular:  Regular  Pulmonary: No wheezing or stridor  Skin: No masses, erthema, lacerations, fluctation, ulcerations  Neurovascular: Motor and sensory exams are grossly intact  Distal pulses are palpable  Limb is warm and well perfused with good color and capillary refill          MUSCULOSKELETAL EXAMINATION:    The right ankle exam demonstrates incisions to be well healed and benign  No erythema, no ecchymosis  Moderate swelling, as to be expected  Near full active and passive ROM without complaints of pain  Tenderness over the medial malleolus with palpable screw  The remainder of the right lower extremity exam is benign  _____________________________________________________  STUDIES REVIEWED:  I have personally reviewed pertinent films and reports in PACS and my interpretation is as follows:     XRs of the right ankle performed today in clinic demonstrates progressive healing without interval changes in fracture or hardware alignment  PROCEDURES PERFORMED:   Procedures  None performed today            Arcelia Vanessa PA-C

## 2021-07-15 NOTE — LETTER
July 16, 2021     Bruno Howe DO  [address]    Patient: Homer Langley   YOB: 1985   Date of Visit: 7/15/2021       Dear Dr Lev Vázquez: Thank you for referring Warren Aguirre to me for evaluation  Below are my notes for this consultation  If you have questions, please do not hesitate to call me  I look forward to following your patient along with you  Sincerely,        Salvador Manuel        CC: No Recipients  [place], [place]  7/15/2021  3:56 PM  Attested  ASSESSMENT/PLAN:    Diagnoses and all orders for this visit:    Closed trimalleolar fracture of right ankle with delayed healing, subsequent encounter  -     XR ankle 3+ vw right; Future  -     Ankle Cude ankle/Ankle Brace  -     Ambulatory referral to Physical Therapy; Future        XRs performed today in clinic were reviewed with the patient  She can transition from the boot to a brace  This was fitted today for her  She should wear the brace during periods of activity but can be removed for sleeping, bathing, and ROM exercises  She was provided a new referral to PT to begin progressing her activity level  We will see her back in 6 weeks for recheck  XRs of the ankle will be obtained upon arrival      Return in about 6 weeks (around 8/26/2021)  _____________________________________________________  CHIEF COMPLAINT:  Chief Complaint   Patient presents with    Right Ankle - Follow-up, Pain    Right Knee - Pain    Follow-up         SUBJECTIVE:  Homer Langley is a 38 y o  female who presents for follow up   Of right ankle fracture  She is nearly 4 and half months status post ORIF right ankle performed by Dr Jese Martinez  She reports she is doing well  She continues to use the crutches for support        PAST MEDICAL HISTORY:  Past Medical History:   Diagnosis Date    Anxiety     Depression     GERD (gastroesophageal reflux disease)     Left ureteral calculus     MRSA (methicillin resistant staph aureus) culture positive     5-10 yrs ago approx- right inner thigh - had abscess    Vertigo     Wears glasses        PAST SURGICAL HISTORY:  Past Surgical History:   Procedure Laterality Date    ABCESS DRAINAGE      ORIF TIBIA & FIBULA FRACTURES Right 2/27/2021    Procedure: OPEN REDUCTION W/ INTERNAL FIXATION (ORIF) ANKLE;  Surgeon: John Collier MD;  [facility];  Service: Orthopedics    VA CYSTO/URETERO W/LITHOTRIPSY &INDWELL STENT INSRT Left 9/30/2020    Procedure: CYSTO, URETEROSCOPY W/HOLMIUM LASER, , STENT, STONE BASKET EXTRACTION;  Surgeon: Clemon Canavan, MD;  [facility];  Service: Urology    TREATMENT FISTULA ANAL      WISDOM TOOTH EXTRACTION         FAMILY HISTORY:  History reviewed  No pertinent family history  SOCIAL HISTORY:  Social History     Tobacco Use    Smoking status: Never Smoker    Smokeless tobacco: Never Used   Vaping Use    Vaping Use: Never used   Substance Use Topics    Alcohol use: Yes     Comment: liquor    Drug use: Never       MEDICATIONS:    Current Outpatient Medications:     ibuprofen (MOTRIN) 600 mg tablet, Take 1 tablet (600 mg total) by mouth every 8 (eight) hours, Disp: 45 tablet, Rfl: 0    LORazepam (ATIVAN) 0 5 mg tablet, Take 0 5 mg by mouth 2 (two) times a day as needed , Disp: , Rfl:     Melatonin 10 MG CAPS, Take 10 mg by mouth daily at bedtime , Disp: , Rfl:     ondansetron (ZOFRAN) 4 mg tablet, Take 1 tablet (4 mg total) by mouth every 6 (six) hours as needed for nausea or vomiting, Disp: 10 tablet, Rfl: 0    vitamin E 46943 units external oil, Apply topically daily, Disp: , Rfl:     NON FORMULARY, CBD lotion (Patient not taking: Reported on 7/15/2021), Disp: , Rfl:     ALLERGIES:  Allergies   Allergen Reactions    Ciprofloxacin Other (See Comments)     Swelling behind right ear    Pollen Extract        REVIEW OF SYSTEMS:  Pertinent items are noted in HPI    A comprehensive review of systems was negative       _____________________________________________________  PHYSICAL EXAMINATION:  General: well developed and well nourished, alert, oriented times 3 and appears comfortable  Psychiatric: Normal  HEENT:  Normocephalic, atraumatic  Cardiovascular:  Regular  Pulmonary: No wheezing or stridor  Skin: No masses, erthema, lacerations, fluctation, ulcerations  Neurovascular: Motor and sensory exams are grossly intact  Distal pulses are palpable  Limb is warm and well perfused with good color and capillary refill  MUSCULOSKELETAL EXAMINATION:    The right ankle exam demonstrates incisions to be well healed and benign  No erythema, no ecchymosis  Moderate swelling, as to be expected  Near full active and passive ROM without complaints of pain  Tenderness over the medial malleolus with palpable screw  The remainder of the right lower extremity exam is benign  _____________________________________________________  STUDIES REVIEWED:  I have personally reviewed pertinent films and reports in PACS and my interpretation is as follows:     XRs of the right ankle performed today in clinic demonstrates progressive healing without interval changes in fracture or hardware alignment  PROCEDURES PERFORMED:   Procedures  None performed today  Alissa Guidry PA-C  Attestation signed by Nixon Blanchard at 7/15/2021  4:00 PM:  I supervised the Advanced Practitioner  I discussed the case with the Advanced Practitioner and reviewed the AP note, prescribed medications, and orders placed      Nixon Blanchard 07/15/21

## 2021-08-26 ENCOUNTER — HOSPITAL ENCOUNTER (OUTPATIENT)
Dept: RADIOLOGY | Facility: CLINIC | Age: 36
Discharge: HOME | End: 2021-08-26
Payer: COMMERCIAL

## 2021-08-26 ENCOUNTER — OFFICE VISIT (OUTPATIENT)
Dept: OBGYN CLINIC | Facility: CLINIC | Age: 36
End: 2021-08-26
Payer: COMMERCIAL

## 2021-08-26 VITALS
HEIGHT: 64 IN | BODY MASS INDEX: 38.89 KG/M2 | DIASTOLIC BLOOD PRESSURE: 82 MMHG | HEART RATE: 90 BPM | TEMPERATURE: 97.8 F | WEIGHT: 227.8 LBS | SYSTOLIC BLOOD PRESSURE: 120 MMHG

## 2021-08-26 DIAGNOSIS — S82.851D CLOSED TRIMALLEOLAR FRACTURE OF RIGHT ANKLE WITH ROUTINE HEALING, SUBSEQUENT ENCOUNTER: ICD-10-CM

## 2021-08-26 DIAGNOSIS — S82.851G CLOSED TRIMALLEOLAR FRACTURE OF RIGHT ANKLE WITH DELAYED HEALING, SUBSEQUENT ENCOUNTER: Primary | ICD-10-CM

## 2021-08-26 PROCEDURE — 99214 OFFICE O/P EST MOD 30 MIN: CPT | Performed by: ORTHOPAEDIC SURGERY

## 2021-08-26 PROCEDURE — 73610 X-RAY EXAM OF ANKLE: CPT

## 2021-08-26 RX ORDER — TOPIRAMATE 50 MG/1
TABLET, FILM COATED ORAL
COMMUNITY
Start: 2021-08-17

## 2021-08-26 RX ORDER — BUPROPION HYDROCHLORIDE 100 MG/1
TABLET ORAL
COMMUNITY
Start: 2021-08-17

## 2021-08-26 NOTE — LETTER
August 26, 2021     Patient: Ly Marrero   YOB: 1985   Date of Visit: 8/26/2021       To Whom it May Concern:    Georges Kvng is under my professional care  She was seen in my office on 8/26/2021  She may return to work with restrictions: must be permitted to wear ankle brace  Please allow intermittent sitting and standing and/or frequent 5 minutes breaks  If you have any questions or concerns, please don't hesitate to call           Sincerely,          Jaylen Pritchard        CC: No Recipients

## 2021-08-26 NOTE — PROGRESS NOTES
ASSESSMENT/PLAN:    Diagnoses and all orders for this visit:    Closed trimalleolar fracture of right ankle with delayed healing, subsequent encounter  -     XR ankle 3+ vw right; Future  -     Osteogenesic Stimulator    Other orders  -     buPROPion (WELLBUTRIN) 100 mg tablet  -     topiramate (TOPAMAX) 50 MG tablet        X-rays performed today in clinic were reviewed with the patient  Bone stimulator was ordered today  She should continue physical therapy and working on increasing her activity  She should continue the brace during periods of activity but can remove it when at home  She was provided a note for work allowing her to return but with restrictions  We will see her back in 6 weeks for recheck  X-rays of the ankle will be obtained upon arrival   She was encouraged to contact the office should questions or concerns arise  Return in about 6 weeks (around 10/7/2021)  _____________________________________________________  CHIEF COMPLAINT:  Chief Complaint   Patient presents with    Right Ankle - Post-op         SUBJECTIVE:  Lesly Ramsey is a 38 y o  female who presents for follow up   Of right trimalleolar ankle fracture  The patient is 6 months status post ORIF she reports continued improvement  She has been going to physical therapy and feels she is making gains  She has been wearing the brace as instructed  She is anxious to return to work  [employer]       PAST MEDICAL HISTORY:  Past Medical History:   Diagnosis Date    Anxiety     Depression     GERD (gastroesophageal reflux disease)     Left ureteral calculus     MRSA (methicillin resistant staph aureus) culture positive     5-10 yrs ago approx- right inner thigh - had abscess    Vertigo     Wears glasses        PAST SURGICAL HISTORY:  Past Surgical History:   Procedure Laterality Date    ABCESS DRAINAGE      ORIF TIBIA & FIBULA FRACTURES Right 2/27/2021    Procedure: OPEN REDUCTION W/ INTERNAL FIXATION (ORIF) ANKLE;  Surgeon: Vivi Dos Santos MD;  Location:  MAIN OR;  Service: Orthopedics    [place] CYSTO/URETERO W/LITHOTRIPSY &INDWELL STENT INSRT Left 9/30/2020    Procedure: CYSTO, URETEROSCOPY W/HOLMIUM LASER, , STENT, STONE BASKET EXTRACTION;  Surgeon: Adithya Rivera MD;  Location[facility];  Service: Urology    TREATMENT FISTULA ANAL      WISDOM TOOTH EXTRACTION         FAMILY HISTORY:  History reviewed  No pertinent family history  SOCIAL HISTORY:  Social History     Tobacco Use    Smoking status: Never Smoker    Smokeless tobacco: Never Used   Vaping Use    Vaping Use: Never used   Substance Use Topics    Alcohol use: Yes     Comment: liquor    Drug use: Never       MEDICATIONS:    Current Outpatient Medications:     buPROPion (WELLBUTRIN) 100 mg tablet, , Disp: , Rfl:     LORazepam (ATIVAN) 0 5 mg tablet, Take 0 5 mg by mouth 2 (two) times a day as needed , Disp: , Rfl:     ondansetron (ZOFRAN) 4 mg tablet, Take 1 tablet (4 mg total) by mouth every 6 (six) hours as needed for nausea or vomiting, Disp: 10 tablet, Rfl: 0    topiramate (TOPAMAX) 50 MG tablet, , Disp: , Rfl:     vitamin E 25843 units external oil, Apply topically daily, Disp: , Rfl:     ibuprofen (MOTRIN) 600 mg tablet, Take 1 tablet (600 mg total) by mouth every 8 (eight) hours (Patient not taking: Reported on 8/26/2021), Disp: 45 tablet, Rfl: 0    Melatonin 10 MG CAPS, Take 10 mg by mouth daily at bedtime  (Patient not taking: Reported on 8/26/2021), Disp: , Rfl:     NON FORMULARY, CBD lotion (Patient not taking: Reported on 7/15/2021), Disp: , Rfl:     ALLERGIES:  Allergies   Allergen Reactions    Ciprofloxacin Other (See Comments)     Swelling behind right ear    Pollen Extract        REVIEW OF SYSTEMS:  Pertinent items are noted in HPI    A comprehensive review of systems was negative       _____________________________________________________  PHYSICAL EXAMINATION:  General: well developed and well nourished, alert, oriented times 3 and appears comfortable  Psychiatric: Normal  HEENT:  Normocephalic, atraumatic  Cardiovascular:  Regular  Pulmonary: No wheezing or stridor  Skin: No masses, erthema, lacerations, fluctation, ulcerations  Neurovascular: Motor and sensory exams are grossly intact, distal pulses are palpable  Limb is warm and well perfused good color and capillary refill the toes  MUSCULOSKELETAL EXAMINATION:      The right ankle exam demonstrates the brace to be in place upon arrival   Her incisions are well healed and benign, no erythema, no ecchymosis  Residual swelling is noted, as to be expected  She has mild tenderness to palpation over the distal fibula  No tenderness elsewhere  She has continued stiffness with ankle range of motion but much improved compared to her last visit  4+/5 strength with resisted ankle range of motion  She has a steady gait with the assistance of a cane  The remainder of the right lower extremity exam is benign  _____________________________________________________  STUDIES REVIEWED:  I have personally reviewed pertinent films and reports in PACS and my interpretation is as follows:       X-rays of the right ankle performed today in clinic demonstrate delayed healing of the right distal fibula  Fracture gap is less than 1 cm  No interval changes in hardware alignment  PROCEDURES PERFORMED:   Procedures  None performed today            Arcelia Vanessa PA-C

## 2021-09-03 ENCOUNTER — HOSPITAL ENCOUNTER (OUTPATIENT)
Dept: ULTRASOUND IMAGING | Facility: HOSPITAL | Age: 36
Discharge: HOME | End: 2021-09-03
Payer: COMMERCIAL

## 2021-09-03 DIAGNOSIS — R22.1 LOCALIZED SWELLING, MASS AND LUMP, NECK: ICD-10-CM

## 2021-09-03 PROCEDURE — 76536 US EXAM OF HEAD AND NECK: CPT

## 2021-09-14 ENCOUNTER — TELEPHONE (OUTPATIENT)
Dept: OBGYN CLINIC | Facility: OTHER | Age: 36
End: 2021-09-14

## 2021-09-14 NOTE — TELEPHONE ENCOUNTER
Patient called in to follow up on the Bone Stim  She has not heard anything from us about it  She did speak to her Insurance, as no prior Judieth Dates is needed, it would be covered      C/b # [id number]

## 2021-10-06 ENCOUNTER — OFFICE VISIT (OUTPATIENT)
Dept: OBGYN CLINIC | Facility: CLINIC | Age: 36
End: 2021-10-06
Payer: COMMERCIAL

## 2021-10-06 ENCOUNTER — HOSPITAL ENCOUNTER (OUTPATIENT)
Dept: RADIOLOGY | Facility: CLINIC | Age: 36
Discharge: HOME | End: 2021-10-06
Payer: COMMERCIAL

## 2021-10-06 VITALS
WEIGHT: 228 LBS | HEART RATE: 94 BPM | SYSTOLIC BLOOD PRESSURE: 110 MMHG | TEMPERATURE: 97.4 F | HEIGHT: 64 IN | DIASTOLIC BLOOD PRESSURE: 74 MMHG | BODY MASS INDEX: 38.93 KG/M2

## 2021-10-06 DIAGNOSIS — S82.851G CLOSED TRIMALLEOLAR FRACTURE OF RIGHT ANKLE WITH DELAYED HEALING, SUBSEQUENT ENCOUNTER: Primary | ICD-10-CM

## 2021-10-06 DIAGNOSIS — S82.851G CLOSED TRIMALLEOLAR FRACTURE OF RIGHT ANKLE WITH DELAYED HEALING, SUBSEQUENT ENCOUNTER: ICD-10-CM

## 2021-10-06 PROCEDURE — 73610 X-RAY EXAM OF ANKLE: CPT

## 2021-10-06 PROCEDURE — 99213 OFFICE O/P EST LOW 20 MIN: CPT | Performed by: ORTHOPAEDIC SURGERY

## 2021-10-06 RX ORDER — FLUTICASONE PROPIONATE 50 MCG
SPRAY, SUSPENSION (ML) NASAL
COMMUNITY
Start: 2021-09-28

## 2021-11-17 ENCOUNTER — OFFICE VISIT (OUTPATIENT)
Dept: OBGYN CLINIC | Facility: CLINIC | Age: 36
End: 2021-11-17
Payer: COMMERCIAL

## 2021-11-17 ENCOUNTER — HOSPITAL ENCOUNTER (OUTPATIENT)
Dept: RADIOLOGY | Facility: CLINIC | Age: 36
Discharge: HOME | End: 2021-11-17
Payer: COMMERCIAL

## 2021-11-17 VITALS
TEMPERATURE: 97.8 F | BODY MASS INDEX: 38.93 KG/M2 | SYSTOLIC BLOOD PRESSURE: 126 MMHG | WEIGHT: 228 LBS | HEIGHT: 64 IN | DIASTOLIC BLOOD PRESSURE: 74 MMHG | HEART RATE: 97 BPM

## 2021-11-17 DIAGNOSIS — S82.851G CLOSED TRIMALLEOLAR FRACTURE OF RIGHT ANKLE WITH DELAYED HEALING, SUBSEQUENT ENCOUNTER: ICD-10-CM

## 2021-11-17 DIAGNOSIS — S82.851G CLOSED TRIMALLEOLAR FRACTURE OF RIGHT ANKLE WITH DELAYED HEALING, SUBSEQUENT ENCOUNTER: Primary | ICD-10-CM

## 2021-11-17 PROCEDURE — 99213 OFFICE O/P EST LOW 20 MIN: CPT | Performed by: ORTHOPAEDIC SURGERY

## 2021-11-17 PROCEDURE — 73610 X-RAY EXAM OF ANKLE: CPT

## 2022-01-17 ENCOUNTER — OFFICE VISIT (OUTPATIENT)
Dept: OBGYN CLINIC | Facility: CLINIC | Age: 37
End: 2022-01-17
Payer: COMMERCIAL

## 2022-01-17 VITALS
TEMPERATURE: 97.3 F | HEIGHT: 64 IN | SYSTOLIC BLOOD PRESSURE: 118 MMHG | WEIGHT: 227 LBS | DIASTOLIC BLOOD PRESSURE: 72 MMHG | BODY MASS INDEX: 38.76 KG/M2 | HEART RATE: 89 BPM

## 2022-01-17 DIAGNOSIS — S82.851D CLOSED TRIMALLEOLAR FRACTURE OF RIGHT ANKLE WITH ROUTINE HEALING, SUBSEQUENT ENCOUNTER: Primary | ICD-10-CM

## 2022-01-17 PROCEDURE — 99213 OFFICE O/P EST LOW 20 MIN: CPT | Performed by: ORTHOPAEDIC SURGERY

## 2022-01-17 RX ORDER — FAMOTIDINE 40 MG/1
TABLET, FILM COATED ORAL
COMMUNITY
Start: 2022-01-14

## 2022-01-17 NOTE — PATIENT INSTRUCTIONS
Ankle Exercises   AMBULATORY CARE:   What you need to know about ankle exercises: Ankle exercises help strengthen your ankle and improve its function after injury  These are beginning exercises  Ask your healthcare provider if you need to see a physical therapist for more advanced exercises  · Do these exercises 3 to 5 days a week , or as directed by your healthcare provider  Ask if you should perform the exercises on each ankle  · Do the exercises in the order that your healthcare provider recommends  This will help prevent swelling, chronic pain, and reinjury  Start with range of motion exercises  Then progress to strengthening exercises, and finally to balancing exercises  · Warm up before you do ankle exercises  Walk or ride a stationary bike for 5 to 10 minutes to prepare your ankle for movement  · Stop if you feel pain  It is normal to feel some discomfort at first  Regular exercise will help decrease your discomfort over time  How to perform range of motion exercises safely:  Begin with range of motion exercises to improve flexibility  Ask your healthcare provider when you can progress to strengthening exercises  · Ankle alphabet:  Sit on a chair so that your feet do not touch the floor  Use your big toe to write each letter of the alphabet  Use only your foot and ankle, and keep your movements small  Do 2 sets  · Calf stretches:      ? Sitting calf stretches with a towel:  Sit on the floor with both legs out straight in front of you  Loop a towel around the ball of your injured foot  Grasp the ends of the towel and pull it toward you  Keep your leg and back straight  Do not lean forward as you pull the towel  Hold for 30 seconds  Then relax for 30 seconds  Do 2 sets of 10          ? Standing calf stretches:  Stand facing a wall with the foot that is not injured forward and your knee slightly bent   Keep the leg with the injured foot straight and behind you with your toes pointed in slightly  With both heels flat on the floor, press your hips forward  Do not arch your back  Hold for 30 seconds, and then relax for 30 seconds  Do 2 sets of 10  Repeat with your leg bent  Do 2 sets of 10  How to perform strengthening exercises safely:  After you can perform range of motion exercises without pain, you may begin strengthening exercises  Ask your healthcare provider when you can progress to balancing exercises  · Ankle movement in 4 directions:  Sit on the floor with your legs straight in front of you  Keep your heels on the floor for support  ? Dorsiflexion:  Begin with your toes pointing straight up  Pull your toes toward your body  Slowly return to the starting position  Do 3 sets of 5      ? Plantar flexion:  Begin with your toes pointing straight up  Push your toes away from your body  Slowly return to the starting position  Do 3 sets of 5          ? Inversion:  Begin with your toes pointing straight up  Push your toes inward, toward each other  Slowly return to the starting position  Do 3 sets of 5      ? Eversion:  Begin with your toes pointing straight up  Push your toes outward, away from each other  Slowly return to the starting position  Do 3 sets of 5        · Toe curls with a towel:  Sit on a chair so that both of your feet are flat on the floor  Place a small towel on the floor in front of your injured foot  Grab the center of the towel with your toes and curl the towel toward you  Relax and repeat  Do 1 set of 5          · [place] pick-ups:  Sit on a chair so that both of your feet are flat on the floor  Place 20 marbles on the floor in front of your injured foot  Use your toes to  one marble at a time and place it into a bowl  Repeat until you have picked up all the marbles  Do 1 set  · Heel raises:      ? Single leg heel raises:  Stand with your weight evenly on both feet  Hold on to a chair or a wall for balance   Lift the foot that is not injured off the floor so all your weight is placed on your injured foot  Raise the heel of your injured foot as high as you can  Slowly lower your heel to the floor  Do 1 set of 10          ? Double leg heel raises:  Stand with your weight evenly on both feet  Hold on to a chair or a wall for balance  Raise both of your heels as high as you can  Slowly lower your heels to the floor  Do 1 set of 10  · Heel and toe walks:      ? Heel walks:  Begin in a standing position  Lift your toes off the floor and walk on your heels  Keep your toes lifted as high as possible  Do 2 sets of 10          ? Toe walks:  Begin in a standing position  Lift your heels off the floor and walk on the balls and toes of your feet  Keep your heels lifted as high as possible  Do 2 sets of 10  How to perform a balance exercise safely:  After you can perform strengthening exercises without pain, you may do this beginning balancing exercise  Ask your healthcare provider for more advanced balance exercises  · Single leg stance:  Stand with your weight evenly on both feet, or hold on to a chair or a wall  Do not lean to the side  Lift the foot that is not injured off the floor so all your weight is placed on your injured foot  Balance on your injured foot  Ask your healthcare provider how long to hold this position  Contact your healthcare provider if:   · Your pain becomes worse  · You have new pain  · You have questions or concerns about your condition, care, or exercise program     © Copyright [employer] 2021 Information is for End User's use only and may not be sold, redistributed or otherwise used for commercial purposes  All illustrations and images included in CareNotes® are the copyrighted property of [employer]  or Ernesto Mak   The above information is an  only  It is not intended as medical advice for individual conditions or treatments   Talk to your doctor, nurse or pharmacist before following any medical regimen to see if it is safe and effective for you

## 2022-01-17 NOTE — PROGRESS NOTES
ASSESSMENT/PLAN:    Problem List Items Addressed This Visit        Musculoskeletal and Integument    Closed trimalleolar fracture of right ankle - Primary          The patient is now 11 months status post right ankle ORIF  She is overall doing very well, with improvements noted in her ankle ROM  The patient was instructed to continue with ROM and strengthening home exercises  She will follow up with the office as needed if any problems arise  Return if symptoms worsen or fail to improve       _____________________________________________________  CHIEF COMPLAINT:  Chief Complaint   Patient presents with    Right Ankle - Post-op         SUBJECTIVE:  Kassi Horta is a 39 y o  female who presents for follow up of right ankle ORIF performed on 2/27/2021 by Dr Peyton Linares  The patient was last seen in office on 11/17/2021, where the patient noted continued discomfort in her ankle with ROM  Today the patient states that she is doing well  The patient reports that she continues to experience what she describes as a painful stiffness in her ankle, which worsens in the cold weather  She notes improvements with dorsiflexion and plantarflexion, but still has difficulty with inversion and eversion  The patient states that she has now discontinued PT, and has not been doing much home exercises  The patient denies any numbness or tingling  The patient has now returned to work and reports no issues with her work duties         PAST MEDICAL HISTORY:  Past Medical History:   Diagnosis Date    Anxiety     Depression     GERD (gastroesophageal reflux disease)     Left ureteral calculus     MRSA (methicillin resistant staph aureus) culture positive     5-10 yrs ago approx- right inner thigh - had abscess    Vertigo     Wears glasses        PAST SURGICAL HISTORY:  Past Surgical History:   Procedure Laterality Date    ABCESS DRAINAGE      ORIF TIBIA & FIBULA FRACTURES Right 2/27/2021    Procedure: OPEN REDUCTION W/ INTERNAL FIXATION (ORIF) ANKLE;  Surgeon: Ish Rascon MD;  [facility];  Service: Orthopedics    [place] CYSTO/URETERO W/LITHOTRIPSY &INDWELL STENT INSRT Left 9/30/2020    Procedure: CYSTO, URETEROSCOPY W/HOLMIUM LASER, , STENT, STONE BASKET EXTRACTION;  Surgeon: Nithya Lemus MD;  Location[facility];  Service: Urology    TREATMENT FISTULA ANAL      WISDOM TOOTH EXTRACTION         FAMILY HISTORY:  History reviewed  No pertinent family history  SOCIAL HISTORY:  Social History     Tobacco Use    Smoking status: Never Smoker    Smokeless tobacco: Never Used   Vaping Use    Vaping Use: Never used   Substance Use Topics    Alcohol use: Yes     Comment: rarely    Drug use: Never       MEDICATIONS:    Current Outpatient Medications:     famotidine (PEPCID) 40 MG tablet, , Disp: , Rfl:     fluticasone (FLONASE) 50 mcg/act nasal spray, , Disp: , Rfl:     LORazepam (ATIVAN) 0 5 mg tablet, Take 0 5 mg by mouth 2 (two) times a day as needed , Disp: , Rfl:     Melatonin 10 MG CAPS, Take 10 mg by mouth daily at bedtime  , Disp: , Rfl:     ondansetron (ZOFRAN) 4 mg tablet, Take 1 tablet (4 mg total) by mouth every 6 (six) hours as needed for nausea or vomiting, Disp: 10 tablet, Rfl: 0    vitamin E 91973 units external oil, Apply topically daily, Disp: , Rfl:     buPROPion (WELLBUTRIN) 100 mg tablet, , Disp: , Rfl:     ibuprofen (MOTRIN) 600 mg tablet, Take 1 tablet (600 mg total) by mouth every 8 (eight) hours (Patient not taking: Reported on 1/17/2022 ), Disp: 45 tablet, Rfl: 0    NON FORMULARY, CBD lotion (Patient not taking: Reported on 7/15/2021), Disp: , Rfl:     topiramate (TOPAMAX) 50 MG tablet, , Disp: , Rfl:     ALLERGIES:  Allergies   Allergen Reactions    Ciprofloxacin Other (See Comments)     Swelling behind right ear    Pollen Extract        REVIEW OF SYSTEMS:  Pertinent items are noted in HPI    A comprehensive review of systems was negative       _____________________________________________________  PHYSICAL EXAMINATION:  General: well developed and well nourished, alert, oriented times 3 and appears comfortable  Psychiatric: Normal  HEENT:  Normocephalic, atraumatic  Cardiovascular:  Regular  Pulmonary: No wheezing or stridor  Skin: No masses, erthema, lacerations, fluctation, ulcerations  Neurovascular: strong distal pulses, sensory and motor testing intact     MUSCULOSKELETAL EXAMINATION:  Right ankle:  - Patient able to ambulate well without limp  - Compression socks removed for examination  - Surgical incisions well healed, skin without lesions, ecchymosis, swelling, erythema, warmth, or other signs of infection  - Full active ankle dorsiflexion, plantarflexion, inversion, and eversion  Patient reported an aching pain with eversion  - 5/5 strength with resisted dorsiflexion/plantarflexion  - Sensation intact along the DP/SP/SA/ZUNIGA/T nerve distributions  - Soft lower extremity compartments  - Brisk cap refill in all toes    _____________________________________________________  STUDIES REVIEWED:  No new imaging performed today    PROCEDURES PERFORMED:   Procedures  None performed today            Marie Ramsey PA-C

## 2022-07-18 ENCOUNTER — HOSPITAL ENCOUNTER (OUTPATIENT)
Dept: RADIOLOGY | Facility: CLINIC | Age: 37
Discharge: HOME | End: 2022-07-18
Payer: COMMERCIAL

## 2022-07-18 ENCOUNTER — OFFICE VISIT (OUTPATIENT)
Dept: OBGYN CLINIC | Facility: CLINIC | Age: 37
End: 2022-07-18
Payer: COMMERCIAL

## 2022-07-18 VITALS
BODY MASS INDEX: 38.76 KG/M2 | HEIGHT: 64 IN | TEMPERATURE: 98.5 F | HEART RATE: 100 BPM | WEIGHT: 227 LBS | DIASTOLIC BLOOD PRESSURE: 72 MMHG | SYSTOLIC BLOOD PRESSURE: 104 MMHG

## 2022-07-18 DIAGNOSIS — S82.851D CLOSED TRIMALLEOLAR FRACTURE OF RIGHT ANKLE WITH ROUTINE HEALING, SUBSEQUENT ENCOUNTER: Primary | ICD-10-CM

## 2022-07-18 DIAGNOSIS — M79.661 PAIN IN RIGHT LOWER LEG: ICD-10-CM

## 2022-07-18 DIAGNOSIS — S82.851D CLOSED TRIMALLEOLAR FRACTURE OF RIGHT ANKLE WITH ROUTINE HEALING, SUBSEQUENT ENCOUNTER: ICD-10-CM

## 2022-07-18 PROCEDURE — 73590 X-RAY EXAM OF LOWER LEG: CPT

## 2022-07-18 PROCEDURE — 99213 OFFICE O/P EST LOW 20 MIN: CPT | Performed by: ORTHOPAEDIC SURGERY

## 2022-07-18 PROCEDURE — 73610 X-RAY EXAM OF ANKLE: CPT

## 2022-07-18 NOTE — PROGRESS NOTES
ASSESSMENT/PLAN:    Problem List Items Addressed This Visit        Musculoskeletal and Integument    Closed trimalleolar fracture of right ankle - Primary    Relevant Orders    XR ankle 3+ vw right    XR tibia fibula 2 vw right       Other    Pain in right lower leg          X-rays taken today in office reviewed and discussed with the patient, which shows no signs of fracture or dislocation and ORIF hardware is intact without signs of loosening or failure  The patient presented with full ROM and strength of the ankle today on exam without pain or instability  The patient noted today that she was spending close to 8 hours a day while at work on her feet, and it was discussed that she should take frequent rests throughout the day and perform stretches/exercises for possible medial tibial stress syndrome  She may use Tylenol/NSAIDs, ice, heat, elevation for symptom relief as well  The patient was offered a script for formal PT, which she declined  The patient will return to the office as needed if her symptoms worsen or fail to improve  Return if symptoms worsen or fail to improve       _____________________________________________________  CHIEF COMPLAINT:  Chief Complaint   Patient presents with    Follow-up         SUBJECTIVE:  Marcos Gaspar is a 39 y o  female who presents for evaluation of ongoing right lower extremity pain s/p right ankle ORIF performed by Dr Nikki Pitt on 2/27/2021 for a right ankle trimalleolar fracture  She states that her symptoms began approximately 2 months ago, though notes that she has been having aches in her ankle since her last office exam approximately 6 months ago  The patient describes pain along the medial aspect of the ankle, but reports that her most concerning pain occurs along the proximal third of the anterior shin  She states that the pain worsens after a long day of work, when she is on her feet for 8 hours a day   The patient states that she wears sneakers while at work  She denies ever having pain along the anterior shin before the fracture  She denies any numbness or tingling in the lower extremity  After work she tries to rest and elevate her lower extremity, which typically resolved her pain, though recently she has noticed she's now going to bed and waking up still in pain  The patient denies any new injury, trauma, or changes in activity  She denies any swelling, redness, fever or chills  She denies any stiffness or loss of range of motion in the ankle  PAST MEDICAL HISTORY:  Past Medical History:   Diagnosis Date    Anxiety     Depression     GERD (gastroesophageal reflux disease)     Left ureteral calculus     MRSA (methicillin resistant staph aureus) culture positive     5-10 yrs ago approx- right inner thigh - had abscess    Vertigo     Wears glasses        PAST SURGICAL HISTORY:  Past Surgical History:   Procedure Laterality Date    ABCESS DRAINAGE      ORIF TIBIA & FIBULA FRACTURES Right 2/27/2021    Procedure: OPEN REDUCTION W/ INTERNAL FIXATION (ORIF) ANKLE;  Surgeon: Radha Johnson MD;  [facility];  Service: Orthopedics    [place] CYSTO/URETERO W/LITHOTRIPSY &INDWELL STENT INSRT Left 9/30/2020    Procedure: CYSTO, URETEROSCOPY W/HOLMIUM LASER, , STENT, STONE BASKET EXTRACTION;  Surgeon: Chela Bird MD;  [facility];  Service: Urology    TREATMENT FISTULA ANAL      WISDOM TOOTH EXTRACTION         FAMILY HISTORY:  History reviewed  No pertinent family history      SOCIAL HISTORY:  Social History     Tobacco Use    Smoking status: Never Smoker    Smokeless tobacco: Never Used   Vaping Use    Vaping Use: Never used   Substance Use Topics    Alcohol use: Yes     Comment: rarely    Drug use: Never       MEDICATIONS:    Current Outpatient Medications:     famotidine (PEPCID) 40 MG tablet, , Disp: , Rfl:     fluticasone (FLONASE) 50 mcg/act nasal spray, , Disp: , Rfl:     LORazepam (ATIVAN) 0 5 mg tablet, Take 0 5 mg by mouth 2 (two) times a day as needed , Disp: , Rfl:     Melatonin 10 MG CAPS, Take 10 mg by mouth daily at bedtime  , Disp: , Rfl:     ondansetron (ZOFRAN) 4 mg tablet, Take 1 tablet (4 mg total) by mouth every 6 (six) hours as needed for nausea or vomiting, Disp: 10 tablet, Rfl: 0    vitamin E 82916 units external oil, Apply topically daily, Disp: , Rfl:     buPROPion (WELLBUTRIN) 100 mg tablet, , Disp: , Rfl:     ibuprofen (MOTRIN) 600 mg tablet, Take 1 tablet (600 mg total) by mouth every 8 (eight) hours (Patient not taking: No sig reported), Disp: 45 tablet, Rfl: 0    NON FORMULARY, CBD lotion (Patient not taking: No sig reported), Disp: , Rfl:     topiramate (TOPAMAX) 50 MG tablet, , Disp: , Rfl:     ALLERGIES:  Allergies   Allergen Reactions    Ciprofloxacin Other (See Comments)     Swelling behind right ear    Pollen Extract        REVIEW OF SYSTEMS:  Pertinent items are noted in HPI  A comprehensive review of systems was negative       _____________________________________________________  PHYSICAL EXAMINATION:  General: well developed and well nourished, alert, oriented times 3 and appears comfortable  Psychiatric: Normal  HEENT:  Normocephalic, atraumatic  Cardiovascular:  Regular  Pulmonary: No wheezing or stridor  Skin: No masses, erthema, lacerations, fluctation, ulcerations  Neurovascular: strong distal pulses, sensory and motor testing intact     MUSCULOSKELETAL EXAMINATION:    Right lower extremity:  - Patient is able to stand and ambulate well on her own, no abnormal gait  She presents in flip flops  - skin without lesions, ecchymosis, erythema, swelling, warmth, or other signs of infection  Surgical incisions along the lateral and medial ankle are well healed  - There is no palpable tenderness along the lateral or medial aspects of the ankle   No tenderness along the tibial shaft/anterior aspect of the distal lower extremity or at the knee   - full active ROM of the ankle without complaint, including eversion and inversion  - full active ROM of the knee without complaint  - negative anterior drawer, negative compression test  - ankle is stable to varus and valgus stress without complaint  - 5/5 strength resisted ankle dorsiflexion/plantarflexion, knee flexion/extension    - sensation and motor intact along the DP/SP/SA/[place]/T nerve distributions  - soft lower extremity compartments, negative Raquel's sign  - strong pedal pulse  - brisk cap refill in all toes      _____________________________________________________  STUDIES REVIEWED:  X-ray of the right ankle and tib/fib taken today in office demonstrate no signs of acute fracture or dislocation  Hardware intact without signs of loosening or failure             Ramu Flores

## 2022-11-27 ENCOUNTER — HOSPITAL ENCOUNTER (EMERGENCY)
Facility: HOSPITAL | Age: 37
Discharge: HOME | End: 2022-11-27
Attending: EMERGENCY MEDICINE

## 2022-11-27 VITALS
HEART RATE: 80 BPM | WEIGHT: 231.8 LBS | RESPIRATION RATE: 16 BRPM | OXYGEN SATURATION: 98 % | DIASTOLIC BLOOD PRESSURE: 80 MMHG | SYSTOLIC BLOOD PRESSURE: 130 MMHG | BODY MASS INDEX: 39.79 KG/M2 | TEMPERATURE: 98 F

## 2022-11-27 DIAGNOSIS — N89.8 VAGINAL CYST: Primary | ICD-10-CM

## 2022-11-27 RX ORDER — OXYCODONE HYDROCHLORIDE AND ACETAMINOPHEN 5; 325 MG/1; MG/1
1 TABLET ORAL ONCE
Status: COMPLETED | OUTPATIENT
Start: 2022-11-27 | End: 2022-11-27

## 2022-11-27 RX ORDER — OXYCODONE HYDROCHLORIDE AND ACETAMINOPHEN 5; 325 MG/1; MG/1
1 TABLET ORAL EVERY 8 HOURS PRN
Qty: 12 TABLET | Refills: 0 | Status: SHIPPED | OUTPATIENT
Start: 2022-11-27 | End: 2022-12-07

## 2022-11-27 RX ADMIN — OXYCODONE HYDROCHLORIDE AND ACETAMINOPHEN 1 TABLET: 5; 325 TABLET ORAL at 11:08

## 2022-11-27 NOTE — ED PROVIDER NOTES
History  Chief Complaint   Patient presents with   • Cyst     States she has a cyst on her "private area that went away and now came back " Noticed it on Thursday it is back  Took motrin this am without relief  States it is not draining,      Patient states she previously had a right labial cyst that was scheduled for excision with her OBGYN but this resolved spontaneously  Now she has a recurrence of a right labial cyst in the same area  Called her OBGYN but was unable to get through due to the holiday, comes to the emergency room due to pain  No drainage or redness or warmth in the area  No vaginal discharge  No other complaints  History provided by:  Patient   used: No    Medical Problem  Location:  Right labia majora inferiorly  Quality:  Painful  Severity:  Moderate  Onset quality:  Gradual  Duration:  4 days  Timing:  Constant  Progression:  Unchanged  Chronicity:  New  Relieved by:  Nothing  Worsened by:  Nothing  Associated symptoms: no abdominal pain, no chest pain, no congestion, no cough, no diarrhea, no ear pain, no fatigue, no fever, no headaches, no myalgias, no nausea, no rash, no rhinorrhea, no shortness of breath, no sore throat, no vomiting and no wheezing        Prior to Admission Medications   Prescriptions Last Dose Informant Patient Reported? Taking?    LORazepam (ATIVAN) 0 5 mg tablet   Yes No   Sig: Take 0 5 mg by mouth 2 (two) times a day as needed    Melatonin 10 MG CAPS   Yes No   Sig: Take 10 mg by mouth daily at bedtime     NON FORMULARY   Yes No   Sig: CBD lotion   Patient not taking: No sig reported   buPROPion (WELLBUTRIN) 100 mg tablet   Yes No   Patient not taking: No sig reported   famotidine (PEPCID) 40 MG tablet   Yes No   fluticasone (FLONASE) 50 mcg/act nasal spray   Yes No   ibuprofen (MOTRIN) 600 mg tablet   No No   Sig: Take 1 tablet (600 mg total) by mouth every 8 (eight) hours   Patient not taking: No sig reported   ondansetron (ZOFRAN) 4 mg tablet   No No   Sig: Take 1 tablet (4 mg total) by mouth every 6 (six) hours as needed for nausea or vomiting   topiramate (TOPAMAX) 50 MG tablet   Yes No   Patient not taking: No sig reported   vitamin E 49639 units external oil   Yes No   Sig: Apply topically daily      Facility-Administered Medications: None       Past Medical History:   Diagnosis Date   • Anxiety    • Depression    • GERD (gastroesophageal reflux disease)    • Left ureteral calculus    • MRSA (methicillin resistant staph aureus) culture positive     5-10 yrs ago approx- right inner thigh - had abscess   • Vertigo    • Wears glasses        Past Surgical History:   Procedure Laterality Date   • ABCESS DRAINAGE     • ORIF TIBIA & FIBULA FRACTURES Right 2/27/2021    Procedure: OPEN REDUCTION W/ INTERNAL FIXATION (ORIF) ANKLE;  Surgeon: Vladimir Sierra MD;  Location:  MAIN OR;  Service: Orthopedics   • NC CYSTO/URETERO W/LITHOTRIPSY &INDWELL STENT INSRT Left 9/30/2020    Procedure: CYSTO, URETEROSCOPY W/HOLMIUM LASER, , STENT, STONE BASKET EXTRACTION;  Surgeon: Jony Miller MD;  Location: [facility];  Service: Urology   • TREATMENT FISTULA ANAL     • WISDOM TOOTH EXTRACTION         History reviewed  No pertinent family history  I have reviewed and agree with the history as documented  E-Cigarette/Vaping   • E-Cigarette Use Never User      E-Cigarette/Vaping Substances     Social History     Tobacco Use   • Smoking status: Never   • Smokeless tobacco: Never   Vaping Use   • Vaping Use: Never used   Substance Use Topics   • Alcohol use: Yes     Comment: rarely   • Drug use: Never       Review of Systems   Constitutional: Negative for chills, fatigue and fever  HENT: Negative for congestion, ear pain, hearing loss, rhinorrhea, sore throat, trouble swallowing and voice change  Eyes: Negative for pain and discharge  Respiratory: Negative for cough, shortness of breath and wheezing      Cardiovascular: Negative for chest pain and palpitations  Gastrointestinal: Negative for abdominal pain, blood in stool, constipation, diarrhea, nausea and vomiting  Genitourinary: Negative for dysuria, flank pain, frequency and hematuria  Musculoskeletal: Negative for joint swelling, myalgias, neck pain and neck stiffness  Skin: Negative for rash and wound  Neurological: Negative for dizziness, seizures, syncope, facial asymmetry and headaches  Psychiatric/Behavioral: Negative for hallucinations, self-injury and suicidal ideas  All other systems reviewed and are negative  Physical Exam  Physical Exam  Vitals and nursing note reviewed  Exam conducted with a chaperone present (GARRICK Muniz)  Constitutional:       General: She is not in acute distress  Appearance: She is well-developed  She is not ill-appearing or diaphoretic  HENT:      Head: Normocephalic and atraumatic  Right Ear: External ear normal       Left Ear: External ear normal    Eyes:      General: No scleral icterus  Right eye: No discharge  Left eye: No discharge  Extraocular Movements: Extraocular movements intact  Conjunctiva/sclera: Conjunctivae normal    Pulmonary:      Effort: Pulmonary effort is normal  No respiratory distress  Genitourinary:     Comments: There is a small 1 x 1 cm cyst in the right inferior labia majora  The area is not red or warm or tender  It is hard and minimally fluctuant  It is more consistent with cyst than abscess  Musculoskeletal:         General: No deformity or signs of injury  Normal range of motion  Cervical back: Normal range of motion and neck supple  Skin:     General: Skin is warm and dry  Coloration: Skin is not jaundiced or pale  Neurological:      General: No focal deficit present  Mental Status: She is alert and oriented to person, place, and time  Cranial Nerves: No cranial nerve deficit        Coordination: Coordination normal       Gait: Gait normal    Psychiatric: Mood and Affect: Mood normal          Behavior: Behavior normal          Thought Content: Thought content normal          Judgment: Judgment normal          Vital Signs  ED Triage Vitals [11/27/22 1001]   Temperature Pulse Respirations Blood Pressure SpO2   98 °F (36 7 °C) 84 16 134/81 98 %      Temp Source Heart Rate Source Patient Position - Orthostatic VS BP Location FiO2 (%)   Temporal Monitor Sitting Right arm --      Pain Score       10 - Worst Possible Pain           Vitals:    11/27/22 1001   BP: 134/81   Pulse: 84   Patient Position - Orthostatic VS: Sitting         Visual Acuity      ED Medications  Medications   oxyCODONE-acetaminophen (PERCOCET) 5-325 mg per tablet 1 tablet (1 tablet Oral Given 11/27/22 1108)       Diagnostic Studies  Results Reviewed     None                 No orders to display              Procedures  Procedures         ED Course                               SBIRT 20yo+    Flowsheet Row Most Recent Value   SBIRT (23 yo +)    In order to provide better care to our patients, we are screening all of our patients for alcohol and drug use  Would it be okay to ask you these screening questions? Yes Filed at: 11/27/2022 1101   Initial Alcohol Screen: US AUDIT-C     1  How often do you have a drink containing alcohol? 0 Filed at: 11/27/2022 1101   2  How many drinks containing alcohol do you have on a typical day you are drinking? 0 Filed at: 11/27/2022 1101   3b  FEMALE Any Age, or MALE 65+: How often do you have 4 or more drinks on one occassion? 1 Filed at: 11/27/2022 1101   Audit-C Score 1 Filed at: 11/27/2022 1101   RAPHAEL: How many times in the past year have you    Used an illegal drug or used a prescription medication for non-medical reasons? Never Filed at: 11/27/2022 1101                    MDM  Number of Diagnoses or Management Options  Diagnosis management comments: Patient has previously been told that this is a cyst and not an abscess and would require incision    On today's examination it does examine more like a cyst then an abscess  Therefore, incision and drainage is not indicated  There is no evidence of infection  There is no redness or warmth or drainage  The area is not significantly tender  Patient advised to follow-up with her OBGYN and is agreeable  Disposition  Final diagnoses:   Vaginal cyst     Time reflects when diagnosis was documented in both MDM as applicable and the Disposition within this note     Time User Action Codes Description Comment    11/27/2022 11:07 AM Daune Smoker Add [N89 8] Vaginal cyst       ED Disposition     ED Disposition   Discharge    Condition   Stable    Date/Time   Sun Nov 27, 2022 11:07 AM    Comment   Meño Dee discharge to home/self care  Follow-up Information     Follow up With Specialties Details Why [address] [place] [address] Medicine   [address] [place] [address]  [phone]            Patient's Medications   Discharge Prescriptions    OXYCODONE-ACETAMINOPHEN (PERCOCET) 5-325 MG PER TABLET    Take 1 tablet by mouth every 8 (eight) hours as needed for moderate pain for up to 10 days Max Daily Amount: 3 tablets       Start Date: 11/27/2022End Date: 12/7/2022       Order Dose: 1 tablet       Quantity: 12 tablet    Refills: 0       No discharge procedures on file      PDMP Review       Value Time User    PDMP Reviewed  Yes 3/15/2021 12:00 PM Romy Carbajal          ED Provider  Electronically Signed by           Shalini Orlando MD  11/27/22 0027

## 2022-11-27 NOTE — DISCHARGE INSTRUCTIONS
Today's examination is more consistent with cyst than abscess  This will likely require excision  Please contact your OBGYN provider for definitive treatment    In the meantime, you may take the medication prescribed as needed for pain

## (undated) DEVICE — SUT VICRYL 2-0 CTB-1 36 IN JB945

## (undated) DEVICE — SUT VICRYL PLUS 2-0 CTB-1 27 IN VCPB259H

## (undated) DEVICE — GLOVE INDICATOR PI UNDERGLOVE SZ 8.5 BLUE

## (undated) DEVICE — SPECIMEN CONTAINER STERILE PEEL PACK

## (undated) DEVICE — PADDING CAST 4 IN  COTTON STRL

## (undated) DEVICE — ACE WRAP 6 IN UNSTERILE

## (undated) DEVICE — INTENDED FOR TISSUE SEPARATION, AND OTHER PROCEDURES THAT REQUIRE A SHARP SURGICAL BLADE TO PUNCTURE OR CUT.: Brand: BARD-PARKER SAFETY BLADES SIZE 10, STERILE

## (undated) DEVICE — PENCIL ELECTROSURG E-Z CLEAN -0035H

## (undated) DEVICE — PVC URETHRAL CATHETER: Brand: DOVER

## (undated) DEVICE — 1.25MM THREADED GUIDE WIRE 150MM

## (undated) DEVICE — DRAPE C-ARMOUR

## (undated) DEVICE — EXIDINE 4 PCT

## (undated) DEVICE — TUBING SUCTION 5MM X 12 FT

## (undated) DEVICE — CHLORAPREP HI-LITE 26ML ORANGE

## (undated) DEVICE — SCD SEQUENTIAL COMPRESSION COMFORT SLEEVE MEDIUM KNEE LENGTH: Brand: KENDALL SCD

## (undated) DEVICE — GLOVE SRG BIOGEL 8.5

## (undated) DEVICE — PACK TUR

## (undated) DEVICE — UROCATCH BAG

## (undated) DEVICE — SUT VICRYL PLUS 0 CTB-1 27 IN VCPB260H

## (undated) DEVICE — SILVER-COATED ANTIMICROBIAL BARRIER DRESSING: Brand: ACTICOAT   4" X 8"

## (undated) DEVICE — BASKET SPECIMEN RETRIVAL 1.9FR 120CM

## (undated) DEVICE — 3.5MM CORTEX SCREW SELF-TAPPING 28MM
Type: IMPLANTABLE DEVICE | Site: ANKLE | Status: NON-FUNCTIONAL
Removed: 2021-02-27

## (undated) DEVICE — 2.7MM CANNULATED DRILL BIT/QC 160MM

## (undated) DEVICE — NEEDLE 25G X 1 1/2

## (undated) DEVICE — GLOVE SRG BIOGEL 7

## (undated) DEVICE — ENDOSCOPIC VALVE WITH ADAPTER.: Brand: SURSEAL® II

## (undated) DEVICE — GUIDEWIRE STRGHT TIP 0.035 IN  SOLO PLUS

## (undated) DEVICE — PREMIUM DRY TRAY LF: Brand: MEDLINE INDUSTRIES, INC.

## (undated) DEVICE — ABDOMINAL PAD: Brand: DERMACEA

## (undated) DEVICE — 3.5MM CORTEX SCREW SELF-TAPPING 40MM
Type: IMPLANTABLE DEVICE | Site: ANKLE | Status: NON-FUNCTIONAL
Removed: 2021-02-27

## (undated) DEVICE — UNIVERSAL MAJOR EXTREMITY,KIT: Brand: CARDINAL HEALTH

## (undated) DEVICE — LASER FIBER HOLMIUM 272MICRON

## (undated) DEVICE — 3.5MM LCP HOOK PLATE 3 HOLE
Type: IMPLANTABLE DEVICE | Site: ANKLE | Status: NON-FUNCTIONAL
Brand: LCP
Removed: 2021-02-27

## (undated) DEVICE — BULB SYRINGE,IRRIGATION WITH PROTECTIVE CAP: Brand: DOVER

## (undated) DEVICE — Device

## (undated) DEVICE — CUFF TOURNIQUET 30 X 4 IN QUICK CONNECT DISP 1BLA

## (undated) DEVICE — SUT ETHILON 2-0 FSLX 30 IN 1674H

## (undated) DEVICE — PAD GROUNDING ADULT

## (undated) DEVICE — SPONGE PVP SCRUB WING STERILE

## (undated) DEVICE — DRAPE C-ARM X-RAY

## (undated) DEVICE — STERILE SURGICAL LUBRICANT,  TUBE: Brand: SURGILUBE

## (undated) DEVICE — 2.5MM DRILL BIT/QC/GOLD/110MM

## (undated) DEVICE — GAUZE SPONGES,16 PLY: Brand: CURITY

## (undated) DEVICE — BANDAGE, ESMARK LF STR 6"X9' (20/CS): Brand: CYPRESS